# Patient Record
Sex: FEMALE | Race: WHITE | NOT HISPANIC OR LATINO | Employment: PART TIME | URBAN - METROPOLITAN AREA
[De-identification: names, ages, dates, MRNs, and addresses within clinical notes are randomized per-mention and may not be internally consistent; named-entity substitution may affect disease eponyms.]

---

## 2017-01-16 ENCOUNTER — ALLSCRIPTS OFFICE VISIT (OUTPATIENT)
Dept: OTHER | Facility: OTHER | Age: 22
End: 2017-01-16

## 2017-05-12 ENCOUNTER — ALLSCRIPTS OFFICE VISIT (OUTPATIENT)
Dept: OTHER | Facility: OTHER | Age: 22
End: 2017-05-12

## 2017-06-09 ENCOUNTER — ALLSCRIPTS OFFICE VISIT (OUTPATIENT)
Dept: OTHER | Facility: OTHER | Age: 22
End: 2017-06-09

## 2017-09-14 ENCOUNTER — ALLSCRIPTS OFFICE VISIT (OUTPATIENT)
Dept: OTHER | Facility: OTHER | Age: 22
End: 2017-09-14

## 2017-11-14 ENCOUNTER — ALLSCRIPTS OFFICE VISIT (OUTPATIENT)
Dept: OTHER | Facility: OTHER | Age: 22
End: 2017-11-14

## 2017-11-15 NOTE — PROGRESS NOTES
Assessment    1  Acute bacterial sinusitis (461 9) (J01 90,B96 89)   2  Encounter for prescription of oral contraceptives (V25 01) (Z30 011)   3  Depression (311) (F32 9)    Plan  Acute bacterial sinusitis    · Cefuroxime Axetil 500 MG Oral Tablet; TAKE 1 TABLET EVERY 12 HOURS DAILY  Depression, Encounter for prescription of oral contraceptives    · Follow-up visit in 4 Months Evaluation and Treatment  Follow-up  Status: Hold For -Scheduling  Requested for: [id number]  Depression, Screening for cardiovascular condition, Screening for deficiency anemia,Screening for lipoid disorders, Screening for thyroid disorder    · (1) TSH WITH FT4 REFLEX; Status:Active; Requested for:14Nov2017;   Encounter for prescription of oral contraceptives    · Apri 0 15-30 MG-MCG Oral Tablet; Take 1 tablet daily  Screening for cardiovascular condition, Screening for deficiency anemia, Screening forlipoid disorders, Screening for thyroid disorder    · (1) CBC/PLT/DIFF; Status:Active; Requested for:14Nov2017;    · (1) COMPREHENSIVE METABOLIC PANEL; Status:Active; Requested for:14Nov2017;    · (1) LIPID PANEL FASTING W DIRECT LDL REFLEX; Status:Active; Requestedfor:14Nov2017;    · Routine Venipuncture - POC; Status:Active - Perform Order; Requested XRU:[id number];     Discussion/Summary    61-year-old female here today for symptoms and concerns as below:  respiratory symptoms: Persisting for a few weeks, questioning allergy component  I will have her start fluticasone nasal spray and a daily antihistamine OTC such as 24 hour Zyrtec  With persistency of symptoms and concern for sinusitis, I will add a 10 day course of Ceftin antibiotic  Patient to get treatment at least a week and should call if she sees no improvement with symptoms  control: Unfortunately patient had side effects including increased depression and emotional instability on Depo-Provera  She was due for her next dose in July but decided to discontinue the medication  Unfortunately she still continues to have minimal spotting that she does not think is consistent with when she gets her normal periods  She has not had a ânormal â  She is still interested in contraception and would like to consider oral birth control pills  Risks versus benefits and side effects discussed  I believe this would serve benefit of getting her periods and cycle back on track in addition to pregnancy prevention  Patient has not been sexually active  I will have her start the 1st pill in the pack the Sunday after she gets her spotting cycle  I will start her on a pre monophasic pack  I will follow up with her in 4 months  Condom use was highly encouraged for secondary pregnancy prevention as well as STD prevention  I did educate patient on the necessity for compliance taking it on a daily basis as well as taking it around the same time of day for it to be most effective  depression/sleep issues: Unfortunately patient continues to struggle emotionally with some depression symptoms  I have highly encouraged that she follow up with a therapist for some psychotherapy as I do believe a large majority of it is related to losing her best friend who passed away a few months ago and has not been able to deal with it since that time  She states that she has not found time to see a therapist as she has been very busy with school which she also has found has helped emotionally by keeping her very busy  She also states a friend from [place] will be coming up to visit which she thinks will also be beneficial  Unfortunately after further questioning with her physical she does admit to intermittent marijuana use mainly at night to help her sleep  Of course I do not recommend this and do not find this as appropriate means to treat the sleep disturbances   However, after discussing various methods of treatment such as trying an antidepressant or atypical antidepressants such as trazodone or nortriptyline to treat the insomnia patient continues to refuse  I educated patient on the risks of utilizing marijuana and that I would not support her use but I certainly cannot force patient to stop  I asked that she continue to think about my recommendations  Possible side effects of new medications were reviewed with the patient/guardian today  The treatment plan was reviewed with the patient/guardian  The patient/guardian understands and agrees with the treatment plan      Chief Complaint  pt concerned about URI sxs and sore throat      History of Present Illness  HPI: 21y/o female here today for URI sxs persisting for past few weeks, now sore throat and painful to swallow  no fevers  Slight sinus pressure/congestion, PND  neck glands swollen  slight dry cough  tried nothing for it  also would like to consider oral BC  has been off depo for a few months  has intermittent spotting but no period since d/c  pt sexually active, not recently  admits trouble sleeping, using marijuana a few times a week to help her sleep  still struggling emotionally, depression, but holding off on therapy b/c busy at school which has been helping  she refuses oral medications to tx insomnia and depression  Review of Systems   Constitutional: as noted in HPI   ENT: as noted in HPI  Cardiovascular: no complaints of slow or fast heart rate, no chest pain, no palpitations, no leg claudication or lower extremity edema  Respiratory: as noted in HPI  Genitourinary: as noted in HPI  Neurological: no complaints of headache, no confusion, no numbness or tingling, no dizziness or fainting  Other Symptoms: psych sxs as above  Active Problems  1  Depression (311) (F32 9)   2  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)    Past Medical History  1  History of Denial Of Any Significant Medical History   2  History of Depression screening (V79 0) (Z13 89)   3  History of Localized superficial swelling of skin (782 2) (R22 9)   4   History of Need for HPV vaccination (V04 89) (Z23)   5  History of Need for influenza vaccination (V04 81) (Z23)   6  History of Need for Menactra vaccination (V03 89) (Z23)   7  History of Pain of left upper arm (729 5) (M79 622)   8  History of Strain of quadriceps muscle, left, initial encounter (843 8) (S44 310V)    Family History  Mother    1  Family history of Hypothyroidism  Maternal Grandmother    2  Family history of Kidney Cancer (V16 51)  Paternal Grandmother    3  Family history of Breast Cancer (V16 3)  Maternal Grandfather    4  Family history of Lymphoma (V16 7)  Paternal Grandfather    11  Family history of Lung Cancer (V16 1)    Social History   · Smoking Cigarettes (V15 82)  The social history was reviewed and updated today  Surgical History    1  History of Oral Surgery Tooth Extraction    Current Meds   1  ValACYclovir HCl - 1 GM Oral Tablet; TAKE 2 TABLETS EVERY 12 HOURS; Therapy: [id number] to (Evaluate:67Qzt3909)  Requested for: 93Ykv8666; Last Rx:[id number] Ordered    The medication list was reviewed and updated today  Allergies  1  No Known Drug Allergies  2  No Known Food Allergies   3  Seasonal    Vitals   Recorded: 02MYP1262 02:34PM   Temperature 98 6 F   Heart Rate 86   Respiration 19   Systolic 560   Diastolic 64   Height 5 ft 8 in   Weight 145 lb 8 oz   BMI Calculated 22 12   BSA Calculated 1 79   O2 Saturation 97   LMP depo   Pain Scale 0       Physical Exam   Constitutional  General appearance: No acute distress, well appearing and well nourished  appears healthy,-- comfortable,-- within normal limits of ideal weight-- and-- appearance reflects stated age  Eyes  Conjunctiva and lids: No swelling, erythema or discharge  Ears, Nose, Mouth, and Throat  External inspection of ears and nose: Normal    Otoscopic examination: Tympanic membranes translucent with normal light reflex  Canals patent without erythema     Nasal mucosa, septum, and turbinates: Abnormal   There was a mucoid discharge from both nares  The bilateral nasal mucosa was boggy,-- edematous-- and-- red  mild swelling and erythema  Oropharynx: Abnormal   The posterior pharynx mild posterior pharynx injection, PND, but-- did not have an exudate  Pulmonary  Respiratory effort: No increased work of breathing or signs of respiratory distress  Auscultation of lungs: Clear to auscultation  Cardiovascular  Auscultation of heart: Normal rate and rhythm, normal S1 and S2, without murmurs  Lymphatic  Palpation of lymph nodes in neck: Abnormal  -- mild tender submandibular glands B/L  Psychiatric  Orientation to person, place, and time: Normal    Mood and affect: Normal   Mood and Affect: appropriate mood-- and-- appropriate affect  Additional Exam:  vitals reviewed          Signatures   Electronically signed by : Gregory Mora, [facility]; Nov 14 2017  3:39PM EST                       (Author)    Electronically signed by : Naty Palma DO; Nov 14 2017  4:29PM EST

## 2017-12-04 DIAGNOSIS — Z83.49 FAMILY HISTORY OF OTHER ENDOCRINE, NUTRITIONAL AND METABOLIC DISEASES (CODE): ICD-10-CM

## 2018-01-11 NOTE — PROGRESS NOTES
Assessment    1  Encounter for preventive health examination (V70 0) (Z00 00)    Plan  Acute bacterial sinusitis    · Cefuroxime Axetil 500 MG Oral Tablet; TAKE 1 TABLET EVERY 12 HOURS DAILY  Depression, Encounter for prescription of oral contraceptives    · Follow-up visit in 4 Months Evaluation and Treatment  Follow-up  Status: Hold For -  Scheduling  Requested for: [id number]  Depression, Screening for cardiovascular condition, Screening for deficiency anemia,  Screening for lipoid disorders, Screening for thyroid disorder    · (1) TSH WITH FT4 REFLEX; Status:Active; Requested for:14Nov2017;   Encounter for prescription of oral contraceptives    · Apri 0 15-30 MG-MCG Oral Tablet; Take 1 tablet daily  Need for influenza vaccination    · Stop: Fluzone Quadrivalent Intramuscular Suspension  Screening for cardiovascular condition, Screening for deficiency anemia, Screening for  lipoid disorders, Screening for thyroid disorder    · (1) CBC/PLT/DIFF; Status:Active; Requested for:14Nov2017;    · (1) COMPREHENSIVE METABOLIC PANEL; Status:Active; Requested for:14Nov2017;    · (1) LIPID PANEL FASTING W DIRECT LDL REFLEX; Status:Active; Requested  for:14Nov2017;     Discussion/Summary  health maintenance visit Currently, she eats a healthy diet and has an inadequate exercise regimen  the risks and benefits of cervical cancer screening were discussed recommended age 26 Testing was done today for no risk  Breast cancer screening: the risks and benefits of breast cancer screening were discussed and breast cancer screening is not indicated  Colorectal cancer screening: colorectal cancer screening is not indicated  Screening lab work includes Patient would like blood work, she is fasting  Will order lipids, CBC, CMP and thyroid for screening  Unfortunately venipuncture was unsuccessful so she will return in a few days to have a redrawn   Patient is concerned about anemia as she does note bumping into her leg and still has faint bruising on her right knee and right lateral upper thigh  We will call her with CBC results and determine if any further testing is needed  The Patient refuses influenza vaccine  She was advised to be evaluated by an ophthalmologist and a dentist  Advice and education were given regarding nutrition, aerobic exercise, weight bearing exercise, reproductive health, contraception and cardiovascular risk reduction  Patient discussion: discussed with the patient, Please refer to acute note for problem discussion  Possible side effects of new medications were reviewed with the patient/guardian today  The treatment plan was reviewed with the patient/guardian  The patient/guardian understands and agrees with the treatment plan      Chief Complaint  Pt presents for annual physical       History of Present Illness  HM, Adult Female: The patient is being seen for a health maintenance evaluation  General Health: The patient's health since the last visit is described as good   pt doing well since last appt, feeling ill, see acute note  no hospitalizations  She does not have regular dental visits  (going to dentist tomorrow)   She denies vision problems  Vision care includes wearing glasses, wearing soft contact lenses and last eye exam: summer 2017  She denies hearing loss  Immunizations status: not up to date   refuses influenza vaccine  Lifestyle:  She consumes a diverse and healthy diet  She is on a low carbohydrate diet  Dietary details include 1 servings of fruit per day, 0-1 servings of vegetables per day, 16-24 ounces of water per day, 1 cups of coffee per day and 1 cans of regular soda per day  She does not have any weight concerns  She does not exercise regularly  She uses tobacco  (smokes marijuana - uses most nights to help sleep)   She denies alcohol use  She denies drug use  Reproductive health:  she reports abnormal menses (spotting since stopping depo july 2017  hasn't had a regular period since that time  spotting every few weeks)  Menstrual history: LMP: the patient is unsure of the date of her LMP  she uses no contraception  she is not sexually active  Screening: Cervical cancer screening includes no previous pap smear  Breast cancer screening includes no previous mammogram  Colorectal cancer screening includes no previous colonoscopy  Review of Systems    Constitutional: No fever, no chills, feels well, no tiredness, no recent weight gain or weight loss  Eyes: No complaints of eye pain, no red eyes, no eyesight problems, no discharge, no dry eyes, no itching of eyes  ENT: see acute note  Cardiovascular: No complaints of slow heart rate, no fast heart rate, no chest pain, no palpitations, no leg claudication, no lower extremity edema  Respiratory: No complaints of shortness of breath, no wheezing, no cough, no SOB on exertion, no orthopnea, no PND  Gastrointestinal: No complaints of abdominal pain, no constipation, no nausea or vomiting, no diarrhea, no bloody stools  Genitourinary: see acute note, but as noted in HPI  Musculoskeletal: occasional neck stiffness/spasm  Integumentary: No complaints of skin rash or lesions, no itching, no skin wounds, no breast pain or lump  Neurological: No complaints of headache, no confusion, no convulsions, no numbness, no dizziness or fainting, no tingling, no limb weakness, no difficulty walking  Psychiatric: see acute note, but as noted in HPI  Endocrine: No complaints of proptosis, no hot flashes, no muscle weakness, no deepening of the voice, no feelings of weakness  Hematologic/Lymphatic: a tendency for easy bruising  Active Problems    1  Depression (311) (F32 9)   2   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)    Past Medical History    · History of Denial Of Any Significant Medical History   · History of Depression screening (V79 0) (Z13 89)   · History of Need for HPV vaccination (V04 89) (Z23)   · History of Need for Menactra vaccination (V03 89) (Z23)    Surgical History    · History of Oral Surgery Tooth Extraction    Family History  Mother    · Family history of Hypothyroidism  Maternal Grandmother    · Family history of Kidney Cancer (V16 51)  Paternal Grandmother    · Family history of Breast Cancer (V16 3)  Maternal Grandfather    · Family history of Lymphoma (V16 7)  Paternal Grandfather    · Family history of Lung Cancer (V16 1)    Social History    · Smoking Cigarettes (V15 82)    Current Meds   1  ValACYclovir HCl - 1 GM Oral Tablet; TAKE 2 TABLETS EVERY 12 HOURS; Therapy: [id number] to (Evaluate:[id number])  Requested for: 86Rwo6891; Last   Rx:[id number] Ordered    Allergies    1  No Known Drug Allergies    2  No Known Food Allergies   3  Seasonal    Vitals   Recorded: 16EAZ7901 02:34PM   Temperature 98 6 F   Heart Rate 86   Respiration 19   Systolic 498   Diastolic 64   Height 5 ft 8 in   Weight 145 lb 8 oz   BMI Calculated 22 12   BSA Calculated 1 79   O2 Saturation 97   LMP depo   Pain Scale 0     Physical Exam    Constitutional   General appearance: No acute distress, well appearing and well nourished  appears healthy, within normal limits of ideal weight and appearance reflects stated age  vitals reviewed  Head and Face   Head and face: Normal     Eyes   Conjunctiva and lids: No swelling, erythema or discharge  Pupils and irises: Equal, round, reactive to light  EOMI, PERRLA B/L  Ears, Nose, Mouth, and Throat   External inspection of ears and nose: Normal     Otoscopic examination: Tympanic membranes translucent with normal light reflex  Canals patent without erythema  Hearing: Normal   grossly normal B/L at 10ft  Nasal mucosa, septum, and turbinates: Abnormal   see acute note  Lips, teeth, and gums: Normal, good dentition  Oropharynx: Abnormal   see acute note  Neck   Neck: Supple, symmetric, trachea midline, no masses  Thyroid: Normal, no thyromegaly      Pulmonary   Respiratory effort: No increased work of breathing or signs of respiratory distress  Auscultation of lungs: Clear to auscultation  Cardiovascular   Auscultation of heart: Normal rate and rhythm, normal S1 and S2, no murmurs  Carotid pulses: 2+ bilaterally  right 2+, no bruit heard over the right carotid, left 2+ and no bruit heard over the left carotid  Pedal pulses: 2+ bilaterally  Examination of extremities for edema and/or varicosities: Normal     Abdomen   Abdomen: Non-tender, no masses  The abdomen was flat  Bowel sounds were normal  The abdomen was soft and nontender  Lymphatic   Palpation of lymph nodes in neck: No lymphadenopathy  Musculoskeletal   Gait and station: Normal     Digits and nails: Normal without clubbing or cyanosis  Joints, bones, and muscles: Normal     Range of motion: Normal     Stability: Normal     Muscle strength/tone: Normal     Skin   Skin and subcutaneous tissue: Abnormal   slight bruising noted right knee and right lateral mid thigh  Neurologic   Cranial nerves: Cranial nerves II-XII intact  Reflexes: 2+ and symmetric  Deep tendon reflexes: 2+ right brachioradialis, 2+ left brachioradialis, 2+ right patella and 2+ left patella  Coordination: Normal finger to nose and heel to shin      Psychiatric   Judgment and insight: Normal     Orientation to person, place, and time: Normal     Mood and affect: Normal        Signatures   Electronically signed by : Shea Sandoval, [facility]; Nov 14 2017  3:46PM EST                       (Author)    Electronically signed by : Varghese Dc DO; Nov 14 2017  4:29PM EST

## 2018-01-12 VITALS
WEIGHT: 156.38 LBS | HEIGHT: 66 IN | OXYGEN SATURATION: 99 % | SYSTOLIC BLOOD PRESSURE: 118 MMHG | DIASTOLIC BLOOD PRESSURE: 78 MMHG | BODY MASS INDEX: 25.13 KG/M2 | TEMPERATURE: 99.5 F | HEART RATE: 64 BPM | RESPIRATION RATE: 16 BRPM

## 2018-01-12 VITALS
HEIGHT: 66 IN | SYSTOLIC BLOOD PRESSURE: 102 MMHG | RESPIRATION RATE: 16 BRPM | OXYGEN SATURATION: 98 % | HEART RATE: 98 BPM | WEIGHT: 147 LBS | TEMPERATURE: 97.9 F | DIASTOLIC BLOOD PRESSURE: 58 MMHG | BODY MASS INDEX: 23.63 KG/M2

## 2018-01-13 VITALS
RESPIRATION RATE: 19 BRPM | HEART RATE: 86 BPM | WEIGHT: 145.5 LBS | TEMPERATURE: 98.6 F | OXYGEN SATURATION: 97 % | BODY MASS INDEX: 22.05 KG/M2 | DIASTOLIC BLOOD PRESSURE: 64 MMHG | SYSTOLIC BLOOD PRESSURE: 102 MMHG | HEIGHT: 68 IN

## 2018-01-13 VITALS
BODY MASS INDEX: 23.84 KG/M2 | SYSTOLIC BLOOD PRESSURE: 100 MMHG | RESPIRATION RATE: 18 BRPM | WEIGHT: 148.38 LBS | DIASTOLIC BLOOD PRESSURE: 62 MMHG | HEIGHT: 66 IN | HEART RATE: 72 BPM | TEMPERATURE: 98.2 F | OXYGEN SATURATION: 98 %

## 2018-01-14 VITALS
WEIGHT: 145.25 LBS | RESPIRATION RATE: 20 BRPM | DIASTOLIC BLOOD PRESSURE: 70 MMHG | HEART RATE: 97 BPM | TEMPERATURE: 98.7 F | BODY MASS INDEX: 22.01 KG/M2 | OXYGEN SATURATION: 99 % | SYSTOLIC BLOOD PRESSURE: 110 MMHG | HEIGHT: 68 IN

## 2018-02-22 RX ORDER — VALACYCLOVIR HYDROCHLORIDE 1 G/1
2 TABLET, FILM COATED ORAL EVERY 12 HOURS
COMMUNITY
Start: 2017-09-14 | End: 2019-03-05 | Stop reason: SDUPTHER

## 2018-02-22 RX ORDER — DESOGESTREL AND ETHINYL ESTRADIOL 0.15-0.03
1 KIT ORAL DAILY
COMMUNITY
Start: 2017-11-14 | End: 2018-07-29 | Stop reason: SDUPTHER

## 2018-03-26 ENCOUNTER — OFFICE VISIT (OUTPATIENT)
Dept: FAMILY MEDICINE CLINIC | Facility: CLINIC | Age: 23
End: 2018-03-26
Payer: COMMERCIAL

## 2018-03-26 VITALS
HEIGHT: 68 IN | DIASTOLIC BLOOD PRESSURE: 70 MMHG | SYSTOLIC BLOOD PRESSURE: 104 MMHG | OXYGEN SATURATION: 99 % | RESPIRATION RATE: 16 BRPM | BODY MASS INDEX: 21.1 KG/M2 | WEIGHT: 139.2 LBS | TEMPERATURE: 99.1 F | HEART RATE: 79 BPM

## 2018-03-26 DIAGNOSIS — M54.50 ACUTE BILATERAL LOW BACK PAIN WITHOUT SCIATICA: ICD-10-CM

## 2018-03-26 DIAGNOSIS — R63.4 ABNORMAL WEIGHT LOSS: ICD-10-CM

## 2018-03-26 DIAGNOSIS — Z30.41 USES ORAL CONTRACEPTION: ICD-10-CM

## 2018-03-26 DIAGNOSIS — F32.A DEPRESSION, UNSPECIFIED DEPRESSION TYPE: Primary | ICD-10-CM

## 2018-03-26 DIAGNOSIS — M25.531 RIGHT WRIST PAIN: ICD-10-CM

## 2018-03-26 DIAGNOSIS — S02.5XXA CLOSED FRACTURE OF TOOTH, INITIAL ENCOUNTER: ICD-10-CM

## 2018-03-26 PROCEDURE — 99214 OFFICE O/P EST MOD 30 MIN: CPT | Performed by: PHYSICIAN ASSISTANT

## 2018-03-26 RX ORDER — TRAMADOL HYDROCHLORIDE 50 MG/1
50 TABLET ORAL EVERY 8 HOURS PRN
Qty: 10 TABLET | Refills: 0 | Status: SHIPPED | OUTPATIENT
Start: 2018-03-26 | End: 2019-11-01

## 2018-03-26 NOTE — PROGRESS NOTES
Assessment/Plan:    Depression  Stable without medication  Looking into seeing therapist with mom which I encouraged  Better since off of depo  Will continue to monitor  No SI  Uses oral contraception  Just started about 4-5 weeks ago, some spotting, normal period during placebo, seeming to tolerate better  Will continue to monitor  Advised PAP at her 23st birthday in august        Diagnoses and all orders for this visit:    Depression, unspecified depression type    Closed fracture of tooth, initial encounter  -     traMADol (ULTRAM) 50 mg tablet; Take 1 tablet (50 mg total) by mouth every 8 (eight) hours as needed for moderate pain or severe pain (tooth pain)    Acute bilateral low back pain without sciatica    Right wrist pain    Uses oral contraception    Abnormal weight loss       Patient is a 61-year-old female presenting today for close follow-up to depression in addition to starting oral contraceptives  Overall her depression has improved since discontinuation of depo provera  There seems to be some life stressors and triggers  I encouraged her to continue pursuing a therapist but will  Just continue to follow and monitor her for now without any medication  She is doing well on oral contraceptives and is tolerating them well  She seems to have some breakthrough spotting which can be normal but did have a normal   On placebo  She will continue with the birth control pills and I suggested scheduling a Pap after 23st birthday this August   Patient has been complaining of some acute right wrist pain with gripping and lifting in addition to some acute low back discomfort without radiation of pain for the past few weeks  She does work at a [employer] and states that she does a lot of hand and wrist work as well as leading over a lot but there seems to be no acute injury or clear cause  I do believe her back could be muscular in addition to her right wrist possibly tendinitis from overuse    I have advised that she continue anti-inflammatories such as ibuprofen, Motrin or Advil as needed taken with food  I advised that she start ice to her back and wrist several times a day in addition to some gentle stretches to her back that were demonstrated in the office  I did suggest that she consider wrist brace wearing it at work and 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 to immobilize  We will see how she does with this conservative management and she should let us know should symptoms worsen  She did also expressed concern about a fractured 2, left lower molar  She does have a dental visit next week but is in extreme  Pain intermittently  There is an obvious large fracture with piece of the tooth missing in center of the tooth exposed  I will place her on tramadol sparingly but only for short while until she can see the dentist and get the tooth fixed  Patient understands and she is aware of side effects of tramadol including drowsiness and should not take this while driving or working  Lastly, I remain concerned about patient's slow weight loss  She has lost about 6 lb since her last appointment in November 2017  This is relatively unintentional but patient states that sometimes she is too busy to remember to eat and her appetite is not as good  She is still overall healthy weight but certainly if her weight loss continues this will be an extreme concern  For now we will continue to monitor  We will see her back in about 4-5 months for her Pap after she turns 23 and recheck  Chief Complaint   Patient presents with    Follow-up     4M    Wrist Pain     x2/3wks on and off pt states she takes motrin w/ little relief   Back Pain     x2/3wks on and off       Subjective:      Patient ID: Whitney Rowley is a 23 y o  female     23y/o female here today for f/u to depression, some concerns  She is having more good days than bad days regarding her depression and emotion   Stressors include being alone, started dating a boy and he moved to [place] for a few months for work  Just started right wrist pain x 2 weeks, no injury but does a lot of squeezing and grasping with right hand, at work, works at a [employer]  She is right handed  She has also been having some LBP leaning over a lot as well  Was having some stomach pain for a few days, was drinking a lot of monster energy drinks, has cut back and is better, cramping feeling in epigastric region, some nausea x 1 week with eating  She had some episodes of spotting since being on [facility] but did have normal period on placebo (approx 3/4)  Has only been on 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     Her family history includes Alpha-1 antitrypsin deficiency in her family; Breast cancer in her paternal grandmother; Hypothyroidism in her mother; Kidney cancer in her maternal grandmother; Lung cancer in her paternal grandfather; Lymphoma in her maternal grandfather  She  reports that she has been smoking Cigarettes  She has never used smokeless tobacco  She reports that she uses drugs, including Marijuana  She reports that she does not drink alcohol    Current Outpatient Prescriptions   Medication Sig Dispense Refill    desogestrel-ethinyl estradiol (APRI) 0 15-30 MG-MCG per tablet Take 1 tablet by mouth daily      valACYclovir (VALTREX) 1,000 mg tablet Take 2 tablets by mouth every 12 (twelve) hours      traMADol (ULTRAM) 50 mg tablet Take 1 tablet (50 mg total) by mouth every 8 (eight) hours as needed for moderate pain or severe pain (tooth pain) 10 tablet 0     No current facility-administered medications for this visit  She is allergic to pollen extract       Review of Systems   Constitutional: Positive for unexpected weight change  HENT:        Tooth issue as in HPI   Respiratory: Negative  Cardiovascular: Negative  Gastrointestinal:        As in HPI   Genitourinary: Negative  Musculoskeletal:        As in HPI   Neurological: Negative  Psychiatric/Behavioral: Negative  Objective:      /70 (BP Location: Left arm, Patient Position: Sitting, Cuff Size: Standard)   Pulse 79   Temp 99 1 °F (37 3 °C) (Tympanic)   Resp 16   Ht 5' 8" (1 727 m)   Wt 63 1 kg (139 lb 3 2 oz)   LMP 01/15/2018   SpO2 99%   BMI 21 17 kg/m²          Physical Exam   Constitutional: She is oriented to person, place, and time  She appears well-developed and well-nourished  Normal BMI but 6lb weight loss since 11/2017  HENT:   Mouth/Throat: Abnormal dentition (left lower molar with obvious destin fracture, piece of tooth missing and center exposed  gum intact  )  Neck: Neck supple  Cardiovascular: Normal rate, regular rhythm, normal heart sounds and normal pulses  Pulmonary/Chest: Effort normal and breath sounds normal    Abdominal: Normal appearance and bowel sounds are normal  There is no tenderness  Musculoskeletal:   Right wrist:   Appearing normal without redness, swelling, ecchymosis or rash  There is no tenderness to palpation of the right wrist, hand or forearm  She has full range of motion of her right wrist without any instability or pain,  strength is intact but does reproduce some mild distal ulnar forearm discomfort on the right side  Low back: Appearing normal, no tenderness to palpation  Full range of motion in all directions  Neurological: She is alert and oriented to person, place, and time  Psychiatric: She has a normal mood and affect  Her behavior is normal  Thought content normal    Vitals reviewed

## 2018-03-28 PROBLEM — B00.1 RECURRENT COLD SORES: Status: ACTIVE | Noted: 2017-09-14

## 2018-03-28 PROBLEM — Z30.41 USES ORAL CONTRACEPTION: Status: ACTIVE | Noted: 2018-03-28

## 2018-03-28 PROBLEM — F32.A DEPRESSION: Status: ACTIVE | Noted: 2017-09-14

## 2018-03-28 NOTE — ASSESSMENT & PLAN NOTE
Stable without medication  Looking into seeing therapist with mom which I encouraged  Better since off of depo  Will continue to monitor   No SI

## 2018-03-28 NOTE — ASSESSMENT & PLAN NOTE
Just started about 4-5 weeks ago, some spotting, normal period during placebo, seeming to tolerate better  Will continue to monitor   Advised PAP at her 23st birthday in august

## 2018-07-29 DIAGNOSIS — Z30.9 ENCOUNTER FOR CONTRACEPTIVE MANAGEMENT, UNSPECIFIED TYPE: Primary | ICD-10-CM

## 2018-07-31 RX ORDER — DESOGESTREL AND ETHINYL ESTRADIOL 0.15-0.03
KIT ORAL
Qty: 28 TABLET | Refills: 5 | Status: SHIPPED | OUTPATIENT
Start: 2018-07-31 | End: 2019-02-11 | Stop reason: SDUPTHER

## 2018-08-06 ENCOUNTER — ANNUAL EXAM (OUTPATIENT)
Dept: FAMILY MEDICINE CLINIC | Facility: CLINIC | Age: 23
End: 2018-08-06
Payer: COMMERCIAL

## 2018-08-06 VITALS
HEART RATE: 78 BPM | SYSTOLIC BLOOD PRESSURE: 114 MMHG | TEMPERATURE: 97.2 F | WEIGHT: 135.3 LBS | DIASTOLIC BLOOD PRESSURE: 70 MMHG | HEIGHT: 66 IN | OXYGEN SATURATION: 96 % | BODY MASS INDEX: 21.74 KG/M2

## 2018-08-06 DIAGNOSIS — Z01.419 ENCOUNTER FOR GYNECOLOGICAL EXAMINATION WITH PAPANICOLAOU SMEAR OF CERVIX: Primary | ICD-10-CM

## 2018-08-06 DIAGNOSIS — Z12.4 SCREENING FOR CERVICAL CANCER: ICD-10-CM

## 2018-08-06 DIAGNOSIS — R63.4 ABNORMAL WEIGHT LOSS: ICD-10-CM

## 2018-08-06 DIAGNOSIS — Z11.3 SCREEN FOR SEXUALLY TRANSMITTED DISEASES: ICD-10-CM

## 2018-08-06 DIAGNOSIS — Z30.41 USES ORAL CONTRACEPTION: ICD-10-CM

## 2018-08-06 LAB
ALBUMIN SERPL BCP-MCNC: 4 G/DL (ref 3.5–5)
ALP SERPL-CCNC: 20 U/L (ref 46–116)
ALT SERPL W P-5'-P-CCNC: 23 U/L (ref 12–78)
ANION GAP SERPL CALCULATED.3IONS-SCNC: 6 MMOL/L (ref 4–13)
AST SERPL W P-5'-P-CCNC: 13 U/L (ref 5–45)
BASOPHILS # BLD AUTO: 0.04 THOUSANDS/ΜL (ref 0–0.1)
BASOPHILS NFR BLD AUTO: 1 % (ref 0–1)
BILIRUB SERPL-MCNC: 0.39 MG/DL (ref 0.2–1)
BUN SERPL-MCNC: 10 MG/DL (ref 5–25)
CALCIUM SERPL-MCNC: 9.4 MG/DL (ref 8.3–10.1)
CHLORIDE SERPL-SCNC: 106 MMOL/L (ref 100–108)
CO2 SERPL-SCNC: 27 MMOL/L (ref 21–32)
CREAT SERPL-MCNC: 0.85 MG/DL (ref 0.6–1.3)
EOSINOPHIL # BLD AUTO: 0.57 THOUSAND/ΜL (ref 0–0.61)
EOSINOPHIL NFR BLD AUTO: 7 % (ref 0–6)
ERYTHROCYTE [DISTWIDTH] IN BLOOD BY AUTOMATED COUNT: 11.9 % (ref 11.6–15.1)
EST. AVERAGE GLUCOSE BLD GHB EST-MCNC: 97 MG/DL
GFR SERPL CREATININE-BSD FRML MDRD: 98 ML/MIN/1.73SQ M
GLUCOSE SERPL-MCNC: 71 MG/DL (ref 65–140)
HBA1C MFR BLD: 5 % (ref 4.2–6.3)
HCT VFR BLD AUTO: 42.9 % (ref 34.8–46.1)
HGB BLD-MCNC: 14.1 G/DL (ref 11.5–15.4)
IMM GRANULOCYTES # BLD AUTO: 0.02 THOUSAND/UL (ref 0–0.2)
IMM GRANULOCYTES NFR BLD AUTO: 0 % (ref 0–2)
LYMPHOCYTES # BLD AUTO: 2.45 THOUSANDS/ΜL (ref 0.6–4.47)
LYMPHOCYTES NFR BLD AUTO: 30 % (ref 14–44)
MCH RBC QN AUTO: 31.1 PG (ref 26.8–34.3)
MCHC RBC AUTO-ENTMCNC: 32.9 G/DL (ref 31.4–37.4)
MCV RBC AUTO: 95 FL (ref 82–98)
MONOCYTES # BLD AUTO: 0.67 THOUSAND/ΜL (ref 0.17–1.22)
MONOCYTES NFR BLD AUTO: 8 % (ref 4–12)
NEUTROPHILS # BLD AUTO: 4.41 THOUSANDS/ΜL (ref 1.85–7.62)
NEUTS SEG NFR BLD AUTO: 54 % (ref 43–75)
NRBC BLD AUTO-RTO: 0 /100 WBCS
PLATELET # BLD AUTO: 207 THOUSANDS/UL (ref 149–390)
PMV BLD AUTO: 12.1 FL (ref 8.9–12.7)
POTASSIUM SERPL-SCNC: 3.8 MMOL/L (ref 3.5–5.3)
PROT SERPL-MCNC: 7.7 G/DL (ref 6.4–8.2)
RBC # BLD AUTO: 4.53 MILLION/UL (ref 3.81–5.12)
SODIUM SERPL-SCNC: 139 MMOL/L (ref 136–145)
TSH SERPL DL<=0.05 MIU/L-ACNC: 0.82 UIU/ML (ref 0.36–3.74)
WBC # BLD AUTO: 8.16 THOUSAND/UL (ref 4.31–10.16)

## 2018-08-06 PROCEDURE — 80053 COMPREHEN METABOLIC PANEL: CPT | Performed by: PHYSICIAN ASSISTANT

## 2018-08-06 PROCEDURE — 87591 N.GONORRHOEAE DNA AMP PROB: CPT | Performed by: PHYSICIAN ASSISTANT

## 2018-08-06 PROCEDURE — G0145 SCR C/V CYTO,THINLAYER,RESCR: HCPCS | Performed by: PHYSICIAN ASSISTANT

## 2018-08-06 PROCEDURE — S0612 ANNUAL GYNECOLOGICAL EXAMINA: HCPCS | Performed by: PHYSICIAN ASSISTANT

## 2018-08-06 PROCEDURE — 84443 ASSAY THYROID STIM HORMONE: CPT | Performed by: PHYSICIAN ASSISTANT

## 2018-08-06 PROCEDURE — 87491 CHLMYD TRACH DNA AMP PROBE: CPT | Performed by: PHYSICIAN ASSISTANT

## 2018-08-06 PROCEDURE — 85025 COMPLETE CBC W/AUTO DIFF WBC: CPT | Performed by: PHYSICIAN ASSISTANT

## 2018-08-06 PROCEDURE — 83036 HEMOGLOBIN GLYCOSYLATED A1C: CPT | Performed by: PHYSICIAN ASSISTANT

## 2018-08-06 PROCEDURE — 36415 COLL VENOUS BLD VENIPUNCTURE: CPT | Performed by: PHYSICIAN ASSISTANT

## 2018-08-06 NOTE — PROGRESS NOTES
Assessment/Plan:      Diagnoses and all orders for this visit:    Encounter for gynecological examination with Papanicolaou smear of cervix    Uses oral contraception    Abnormal weight loss  -     CBC and differential  -     Comprehensive metabolic panel  -     TSH, 3rd generation with T4 reflex  -     Hemoglobin A1C    Screening for cervical cancer  -     Liquid-based pap, screening    Screen for sexually transmitted diseases  -     Chlamydia/GC amplified DNA by PCR; Future        [de-identified] year old female presenting today for gyn exam/ 1st Pap smear  Age-appropriate education/preventatives discussed  STD screening today  Pap collected and we will call her with those results as well  She is doing well on her oral contraceptive and is compliant  Exam unremarkable today  She has lost about 30 lb in the past year and a half or so but patient states intentional as she has been eating well, exercising  And working a lot and has been living a more healthy lifestyle than what she was  I would prefer to collect some blood work today to rule out underlying cause including CBC, CMP, A1c in thyroid  We will call her with blood work results when available  Otherwise will do 3 consecutive yearly PAPs based on results, then consider q2-3 years if all normal     Chief Complaint   Patient presents with    Gynecologic Exam       Subjective:     Patient ID: Anna Self is a 26 y o  female  24y/o female here today for annual PAP/GYN, first  She overall is doing well, no concerns  She has lost weight from last previous appts, about 30lbs, But attributes that to eating better, very active and also exercising a lot  Emotionally she is doing well  Denies eating d/o sxs  She takes MVI  Wearing sunscreen, seatbelt  Feels safe at home  Dental cleanings q6 months  Wears contacts/glasses, seeing eye dr Madelin Powell  Pt has been sexually active in past, feb 2018 last time  Uses condoms, compliant on oral BC   Periods are normal, minimal cramping  She gets period with placebo (on oral BC)  LMP: approx July 30, 2018  Review of Systems   Constitutional: Negative  Respiratory: Negative  Cardiovascular: Negative  Gastrointestinal: Negative  Endocrine: Negative  Genitourinary: Negative  Neurological: Negative  Psychiatric/Behavioral: Negative  The following portions of the patient's history were reviewed and updated as appropriate: allergies, current medications, past family history, past medical history, past social history, past surgical history and problem list       Objective:     Physical Exam   Constitutional: Vital signs are normal  She appears well-developed and well-nourished  She does not appear ill  Cardiovascular: Normal rate, regular rhythm, normal heart sounds and normal pulses  No murmur heard  Pulmonary/Chest: Effort normal and breath sounds normal    Abdominal: Normal appearance and bowel sounds are normal  There is no tenderness  Genitourinary: Vagina normal and uterus normal  No breast swelling, tenderness, discharge or bleeding  There is no rash, tenderness or lesion on the right labia  There is no rash, tenderness or lesion on the left labia  Cervix exhibits no motion tenderness and no discharge  Right adnexum displays no mass and no tenderness  Left adnexum displays no mass and no tenderness  No tenderness in the vagina  No vaginal discharge found  Genitourinary Comments: Breasts appearing normal without rash, bruising, dimpling or distortion  No palpable masses In either breast   No nipple discharge  No axillary LAD  PAP performed     Lymphadenopathy:     She has no cervical adenopathy  Right: No inguinal adenopathy present  Left: No inguinal adenopathy present  Psychiatric: She has a normal mood and affect  Her speech is normal    Vitals reviewed        Vitals:    08/06/18 1425   BP: 114/70   BP Location: Left arm   Patient Position: Sitting   Pulse: 78 Temp: (!) 97 2 °F (36 2 °C)   TempSrc: Tympanic   SpO2: 96%   Weight: 61 4 kg (135 lb 4 8 oz)   Height: 5' 6" (1 676 m)

## 2018-08-07 LAB
CHLAMYDIA DNA CVX QL NAA+PROBE: NORMAL
N GONORRHOEA DNA GENITAL QL NAA+PROBE: NORMAL

## 2018-08-09 LAB
LAB AP GYN PRIMARY INTERPRETATION: NORMAL
LAB AP LMP: NORMAL
Lab: NORMAL
PATH INTERP SPEC-IMP: NORMAL

## 2018-08-13 ENCOUNTER — TELEPHONE (OUTPATIENT)
Dept: FAMILY MEDICINE CLINIC | Facility: CLINIC | Age: 23
End: 2018-08-13

## 2019-02-11 DIAGNOSIS — Z30.9 ENCOUNTER FOR CONTRACEPTIVE MANAGEMENT, UNSPECIFIED TYPE: ICD-10-CM

## 2019-02-11 RX ORDER — DESOGESTREL AND ETHINYL ESTRADIOL 0.15-0.03
KIT ORAL
Qty: 28 TABLET | Refills: 5 | Status: SHIPPED | OUTPATIENT
Start: 2019-02-11 | End: 2019-07-27 | Stop reason: SDUPTHER

## 2019-03-05 ENCOUNTER — TELEPHONE (OUTPATIENT)
Dept: FAMILY MEDICINE CLINIC | Facility: CLINIC | Age: 24
End: 2019-03-05

## 2019-03-05 DIAGNOSIS — B00.1 RECURRENT COLD SORES: Primary | ICD-10-CM

## 2019-03-05 RX ORDER — VALACYCLOVIR HYDROCHLORIDE 1 G/1
2000 TABLET, FILM COATED ORAL EVERY 12 HOURS
Qty: 8 TABLET | Refills: 2 | Status: SHIPPED | OUTPATIENT
Start: 2019-03-05 | End: 2019-06-19 | Stop reason: SDUPTHER

## 2019-03-05 NOTE — TELEPHONE ENCOUNTER
Please call mom and let her know that I [facility] her Valt[facility]  However [facility] is listed as her main pharmacy in chart so we may want to check with mom to see what pharmacy is preference and update it as you said for me to send this to [facility]

## 2019-03-05 NOTE — TELEPHONE ENCOUNTER
Pt's mom Maren Jc called whom is on pt's consent stating Nehal Kendrick needs a refill on her Valtrex but can not find the bottle to confirm pt's current dose  In [facility] pt was prescribed Valacyclovir 1 gm take 2 tablets every 12 hours   In Epic it was pulled in as 1,000 mg which equal 1 GM     Last prescribed 9/14/2017 by Seth Bowman    Pt last had a med check in the office on 3/26/2018 no future appointments are scheduled at this time  Seth Bowman would  you refill pt's Valtrex? Pt uses the [facility] on [address]  I will gladly charley up for you   Any questions please contact Maren Hosea at [phone]

## 2019-03-05 NOTE — TELEPHONE ENCOUNTER
I called and spoke to pt's mom Delfino Shafer informed Chance Rivera sent Valtrex to [facility] per pt's mom I did remove [facility] since she no longer will be using it  Did confirm primary pharmacy is [facility]

## 2019-06-19 DIAGNOSIS — B00.1 RECURRENT COLD SORES: ICD-10-CM

## 2019-06-20 RX ORDER — VALACYCLOVIR HYDROCHLORIDE 1 G/1
TABLET, FILM COATED ORAL
Qty: 8 TABLET | Refills: 0 | Status: SHIPPED | OUTPATIENT
Start: 2019-06-20 | End: 2019-10-22 | Stop reason: SDUPTHER

## 2019-07-27 DIAGNOSIS — Z30.9 ENCOUNTER FOR CONTRACEPTIVE MANAGEMENT, UNSPECIFIED TYPE: ICD-10-CM

## 2019-07-29 RX ORDER — DESOGESTREL AND ETHINYL ESTRADIOL 0.15-0.03
KIT ORAL
Qty: 28 TABLET | Refills: 0 | Status: SHIPPED | OUTPATIENT
Start: 2019-07-29 | End: 2019-09-03 | Stop reason: SDUPTHER

## 2019-09-03 DIAGNOSIS — Z30.9 ENCOUNTER FOR CONTRACEPTIVE MANAGEMENT, UNSPECIFIED TYPE: ICD-10-CM

## 2019-09-03 RX ORDER — DESOGESTREL AND ETHINYL ESTRADIOL 0.15-0.03
KIT ORAL
Qty: 28 TABLET | Refills: 0 | Status: SHIPPED | OUTPATIENT
Start: 2019-09-03 | End: 2019-09-29 | Stop reason: SDUPTHER

## 2019-09-29 DIAGNOSIS — Z30.9 ENCOUNTER FOR CONTRACEPTIVE MANAGEMENT, UNSPECIFIED TYPE: ICD-10-CM

## 2019-09-29 RX ORDER — DESOGESTREL AND ETHINYL ESTRADIOL 0.15-0.03
KIT ORAL
Qty: 28 TABLET | Refills: 0 | Status: SHIPPED | OUTPATIENT
Start: 2019-09-29 | End: 2019-11-01

## 2019-09-30 DIAGNOSIS — Z30.9 ENCOUNTER FOR CONTRACEPTIVE MANAGEMENT, UNSPECIFIED TYPE: ICD-10-CM

## 2019-09-30 RX ORDER — DESOGESTREL AND ETHINYL ESTRADIOL 0.15-0.03
KIT ORAL
Qty: 28 TABLET | Refills: 0 | Status: SHIPPED | OUTPATIENT
Start: 2019-09-30 | End: 2019-11-01 | Stop reason: SDUPTHER

## 2019-10-22 DIAGNOSIS — B00.1 RECURRENT COLD SORES: ICD-10-CM

## 2019-10-22 RX ORDER — VALACYCLOVIR HYDROCHLORIDE 1 G/1
TABLET, FILM COATED ORAL
Qty: 8 TABLET | Refills: 0 | Status: SHIPPED | OUTPATIENT
Start: 2019-10-22 | End: 2019-11-01 | Stop reason: SDUPTHER

## 2019-11-01 ENCOUNTER — OFFICE VISIT (OUTPATIENT)
Dept: INTERNAL MEDICINE CLINIC | Facility: CLINIC | Age: 24
End: 2019-11-01

## 2019-11-01 ENCOUNTER — TELEPHONE (OUTPATIENT)
Dept: INTERNAL MEDICINE CLINIC | Facility: CLINIC | Age: 24
End: 2019-11-01

## 2019-11-01 VITALS
WEIGHT: 154.76 LBS | BODY MASS INDEX: 24.87 KG/M2 | SYSTOLIC BLOOD PRESSURE: 100 MMHG | HEIGHT: 66 IN | DIASTOLIC BLOOD PRESSURE: 74 MMHG | HEART RATE: 72 BPM | TEMPERATURE: 97.9 F

## 2019-11-01 DIAGNOSIS — F32.A DEPRESSION, UNSPECIFIED DEPRESSION TYPE: ICD-10-CM

## 2019-11-01 DIAGNOSIS — Z01.419 ROUTINE GYNECOLOGICAL EXAMINATION: ICD-10-CM

## 2019-11-01 DIAGNOSIS — M54.50 ACUTE BILATERAL LOW BACK PAIN WITHOUT SCIATICA: ICD-10-CM

## 2019-11-01 DIAGNOSIS — B36.0 TINEA VERSICOLOR: ICD-10-CM

## 2019-11-01 DIAGNOSIS — Z30.9 ENCOUNTER FOR CONTRACEPTIVE MANAGEMENT, UNSPECIFIED TYPE: Primary | ICD-10-CM

## 2019-11-01 DIAGNOSIS — Z23 NEED FOR TDAP VACCINATION: ICD-10-CM

## 2019-11-01 DIAGNOSIS — B00.1 RECURRENT COLD SORES: ICD-10-CM

## 2019-11-01 PROCEDURE — 90715 TDAP VACCINE 7 YRS/> IM: CPT | Performed by: PHYSICIAN ASSISTANT

## 2019-11-01 PROCEDURE — 3008F BODY MASS INDEX DOCD: CPT | Performed by: PHYSICIAN ASSISTANT

## 2019-11-01 PROCEDURE — 90471 IMMUNIZATION ADMIN: CPT | Performed by: PHYSICIAN ASSISTANT

## 2019-11-01 PROCEDURE — 99204 OFFICE O/P NEW MOD 45 MIN: CPT | Performed by: PHYSICIAN ASSISTANT

## 2019-11-01 RX ORDER — VALACYCLOVIR HYDROCHLORIDE 1 G/1
2000 TABLET, FILM COATED ORAL 2 TIMES DAILY
Qty: 8 TABLET | Refills: 2 | Status: SHIPPED | OUTPATIENT
Start: 2019-11-01 | End: 2021-06-04

## 2019-11-01 RX ORDER — KETOCONAZOLE 20 MG/G
CREAM TOPICAL DAILY
Qty: 60 G | Refills: 2 | Status: SHIPPED | OUTPATIENT
Start: 2019-11-01 | End: 2020-06-11

## 2019-11-01 RX ORDER — DESOGESTREL AND ETHINYL ESTRADIOL 0.15-0.03
1 KIT ORAL DAILY
Qty: 28 TABLET | Refills: 5 | Status: SHIPPED | OUTPATIENT
Start: 2019-11-01 | End: 2020-04-27

## 2019-11-01 RX ORDER — VALACYCLOVIR HYDROCHLORIDE 500 MG/1
500 TABLET, FILM COATED ORAL DAILY
Qty: 30 TABLET | Refills: 5 | Status: SHIPPED | OUTPATIENT
Start: 2019-11-01 | End: 2020-06-11

## 2019-11-01 RX ORDER — CYCLOBENZAPRINE HCL 10 MG
10 TABLET ORAL
Qty: 30 TABLET | Refills: 0 | Status: SHIPPED | OUTPATIENT
Start: 2019-11-01 | End: 2020-06-15

## 2019-11-01 RX ORDER — MELOXICAM 15 MG/1
15 TABLET ORAL DAILY
Qty: 30 TABLET | Refills: 0 | Status: SHIPPED | OUTPATIENT
Start: 2019-11-01 | End: 2020-06-11

## 2019-11-01 NOTE — ASSESSMENT & PLAN NOTE
Patient asking for refill of Valtrex 1000 mg dosing p r n  cold sore exacerbation  However she notes more frequent cold sores this year  Discussed lower dose Valtrex 500 mg daily for maintenance and prevention of frequent cold sores which both were prescribed for her today

## 2019-11-01 NOTE — PATIENT INSTRUCTIONS
[address]      Birth control- please restart the Sunday after her period starts  Please remember to use condoms consistently whether on birth control or not  Please start Valtrex 500 mg once a day for maintenance and prevention of frequent cold sores, Valtrex 2000 mg twice a day for 1-2 days for outbreak  Meloxicam in the morning with food, cyclobenzaprine muscle relaxer at night before bed for a few weeks as well as warm compresses or heating pad and gentle stretches to the legs and back  Make appointment with gyn to establish and for birth control check      Please call around make appointment with a therapist

## 2019-11-01 NOTE — PROGRESS NOTES
Assessment/Plan:    Recurrent cold sores  Patient asking for refill of Valtrex 1000 mg dosing p r n  cold sore exacerbation  However she notes more frequent cold sores this year  Discussed lower dose Valtrex 500 mg daily for maintenance and prevention of frequent cold sores which both were prescribed for her today  Uses oral contraception  Birth control med check performed today as I had previously prescribed this for her at my old office as well as performed her gyn exam and Pap  She is up-to-date with Pap performed 2018 which was normal   She will be due in 3 years  However I did refer her to gyn to establish for ongoing birth control maintenance and future cervical cancer screening  She also consider STD testing if warranted  Depression  Patient overall noting some improvement with her depression  She notes that she has new friends to her new job as well as a very supportive new boyfriend for the past 9 months  She does have days where she does not want to get out of bed which even diet with good days unfortunately  I do not feel medication is appropriate at this time and I do believe we did try medication in the past but she decided to stop it  I have encouraged her to consider finding a therapist which has also been discussed with her in the past   Patient given list of mental health locations that she can check into with her insurance  Diagnoses and all orders for this visit:    Encounter for contraceptive management, unspecified type  -     desogestrel-ethinyl estradiol (ISIBLOOM) 0 15-30 MG-MCG per tablet; Take 1 tablet by mouth daily    Recurrent cold sores  -     valACYclovir (VALTREX) 1,000 mg tablet; Take 2 tablets (2,000 mg total) by mouth 2 (two) times a day for 2 days  -     valACYclovir (VALTREX) 500 mg tablet;  Take 1 tablet (500 mg total) by mouth daily    Depression, unspecified depression type    Acute bilateral low back pain without sciatica  -     meloxicam (MOBIC) 15 mg tablet; Take 1 tablet (15 mg total) by mouth daily With food  -     cyclobenzaprine (FLEXERIL) 10 mg tablet; Take 1 tablet (10 mg total) by mouth daily at bedtime    Tinea versicolor  -     ketoconazole (NIZORAL) 2 % cream; Apply topically daily    Need for Tdap vaccination  -     TDAP VACCINE GREATER THAN OR EQUAL TO 6YO IM    Routine gynecological examination  -     Ambulatory referral to Obstetrics / Gynecology; Future      Patient is a pleasant 23y/o female presenting today to establish, she is a former patient of mine at my previous office  Assessment and plan and discussion of her chronic conditions as above  Patient has been dealing with acute low back pain mainly on the left side that she attributes to standing long periods at her job  I do not feel any x-rays are necessary  Most likely muscular as she also has tightness in her left hamstring as well  I will start her on a 2-3 week course of meloxicam once a day with food as well as cyclobenzaprine at night with side effects of both discussed  I have advised moist heat or heating pad to the low back in addition to gentle stretches to the lumbar spine as well as the bilateral quadriceps and hamstrings which certainly poor flexibility in these areas can affect the back  Patient expresses understanding and will see how she does with the course of the next 4-6 weeks  Patient also has a rash noted for the past 1-2 months on her upper chest, neck and upper back most consistent with tinea versicolor  Ketoconazole cream applied twice daily to the affected areas was prescribed and sent to her pharmacy  Tdap updated today  Patient declines flu vaccine despite recommendations  Patient has been referred to gyn to establish  I will see her back in about 4-6 months for another follow-up, sooner if any problems arise  Chief Complaint   Patient presents with    Establish Care       Subjective:      Patient ID: Gallo Choudhary is a 27 y o  female  23y/o female here today to establish, she is a former patient of mine at previous office [facility]  She needs refill of her birth control medication  She has been out for 1 week  Pt used to see me at my other office for GYN care/PAP and birth control refills and never established with new GYN after I left  She is asking for referral to GYN and refill of medication  She states this year has been bad for cold sores  She states most recent outbreak started last week  Had about 6 outbreaks this year so far which is more than what she has had in past  She attributes it to stress of work and school, as well as dry lips  She also admits to rash on upper chest, neck, back of shoulders and upper back x 1-2 months  Sometimes itchy  No known triggers  Currently in monogamous sexual relationship with current boyfriend x 9 months, consistent condom use  No pain during intercourse or pelvic pain, vaginal or urinary sxs  She has hx of depression  States she overall is doing well  She has noted new friends and has made things better  She states she has ups and down but overall doing well  She states there are some days she doesn't want to get out of bed, even with good days  She does c/o some low back pain left side sometimes wrapping around into left hip and groin and into buttocks and down posterior thigh  She notes this past few months, espeically at work standing long periods  The following portions of the patient's history were reviewed and updated as appropriate: allergies, current medications, past family history, past medical history, past social history, past surgical history and problem list     Review of Systems   Constitutional: Negative  HENT: Negative  Respiratory: Negative  Cardiovascular: Negative  Gastrointestinal: Negative  Genitourinary: Negative           Periods were regular while on oral BC   Musculoskeletal:        As in HPI   Skin:        As in HPI Neurological:        As in HPI   Psychiatric/Behavioral:        As in HPI         Objective:      /74 (BP Location: Right arm, Patient Position: Sitting, Cuff Size: Standard)   Pulse 72   Temp 97 9 °F (36 6 °C) (Oral)   Ht 5' 6" (1 676 m)   Wt 70 2 kg (154 lb 12 2 oz)   BMI 24 98 kg/m²          Physical Exam   Constitutional: She is oriented to person, place, and time  She appears well-developed and well-nourished  No distress  Neck: Normal range of motion and phonation normal  Neck supple  Normal carotid pulses present  No spinous process tenderness and no muscular tenderness present  Carotid bruit is not present  No thyroid mass present  Cardiovascular: Normal rate, regular rhythm, normal heart sounds and normal pulses  Pulmonary/Chest: Effort normal and breath sounds normal    Abdominal: Soft  Normal appearance and bowel sounds are normal  There is no tenderness  Musculoskeletal:        Lumbar back: She exhibits tenderness  She exhibits no swelling, no edema and no spasm  Back:    Patient has full range of motion of her lumbar spine in all directions with minimal discomfort in the left low back  She does have poor hamstring flexibility noted on the left side with examination of the hip  She does not demonstrate any instability or exquisite pain with left hip flexion, extension, abduction or adduction  Lymphadenopathy:        Head (right side): No submandibular and no tonsillar adenopathy present  Head (left side): No submandibular and no tonsillar adenopathy present  Neurological: She is alert and oriented to person, place, and time  Coordination and gait normal    Skin:   Right upper lip with scabbing associated with herpes cold sore  Multiple faint erythematous patches, mainly circular or oval in nature, slightly velvety, consistent with tinea versicolor on neck, upper chest and upper back  Psychiatric: She has a normal mood and affect   Her speech is normal and behavior is normal  Thought content normal  She expresses no homicidal and no suicidal ideation  She expresses no suicidal plans and no homicidal plans  Noncommunicative: Unk Iron Vitals reviewed

## 2019-11-01 NOTE — ASSESSMENT & PLAN NOTE
Patient overall noting some improvement with her depression  She notes that she has new friends to her new job as well as a very supportive new boyfriend for the past 9 months  She does have days where she does not want to get out of bed which even diet with good days unfortunately  I do not feel medication is appropriate at this time and I do believe we did try medication in the past but she decided to stop it  I have encouraged her to consider finding a therapist which has also been discussed with her in the past   Patient given list of mental health locations that she can check into with her insurance

## 2019-11-01 NOTE — ASSESSMENT & PLAN NOTE
Birth control med check performed today as I had previously prescribed this for her at my old office as well as performed her gyn exam and Pap  She is up-to-date with Pap performed 2018 which was normal   She will be due in 3 years  However I did refer her to gyn to establish for ongoing birth control maintenance and future cervical cancer screening  She also consider STD testing if warranted

## 2019-11-01 NOTE — TELEPHONE ENCOUNTER
Please call pt - I forgot to send in cream for the rash - I sent it into pharmacy for her called ketoconazole she will apply to rash twice a day for 1-2 weeks  TY I have a few refills on medication

## 2020-04-26 DIAGNOSIS — Z30.9 ENCOUNTER FOR CONTRACEPTIVE MANAGEMENT, UNSPECIFIED TYPE: ICD-10-CM

## 2020-04-27 RX ORDER — DESOGESTREL AND ETHINYL ESTRADIOL 0.15-0.03
KIT ORAL
Qty: 84 TABLET | Refills: 3 | Status: SHIPPED | OUTPATIENT
Start: 2020-04-27 | End: 2021-03-17

## 2020-06-08 ENCOUNTER — TELEPHONE (OUTPATIENT)
Dept: INTERNAL MEDICINE CLINIC | Facility: CLINIC | Age: 25
End: 2020-06-08

## 2020-06-11 ENCOUNTER — OFFICE VISIT (OUTPATIENT)
Dept: INTERNAL MEDICINE CLINIC | Facility: CLINIC | Age: 25
End: 2020-06-11

## 2020-06-11 VITALS
DIASTOLIC BLOOD PRESSURE: 72 MMHG | HEART RATE: 62 BPM | BODY MASS INDEX: 24.87 KG/M2 | WEIGHT: 154.76 LBS | SYSTOLIC BLOOD PRESSURE: 112 MMHG | TEMPERATURE: 97.8 F | HEIGHT: 66 IN

## 2020-06-11 DIAGNOSIS — Z30.41 USES ORAL CONTRACEPTION: ICD-10-CM

## 2020-06-11 DIAGNOSIS — B00.1 RECURRENT COLD SORES: ICD-10-CM

## 2020-06-11 DIAGNOSIS — F32.A DEPRESSION, UNSPECIFIED DEPRESSION TYPE: Primary | ICD-10-CM

## 2020-06-11 DIAGNOSIS — M54.50 INTERMITTENT LOW BACK PAIN: ICD-10-CM

## 2020-06-11 DIAGNOSIS — Z11.4 SCREENING FOR HIV (HUMAN IMMUNODEFICIENCY VIRUS): ICD-10-CM

## 2020-06-11 PROCEDURE — 99214 OFFICE O/P EST MOD 30 MIN: CPT | Performed by: PHYSICIAN ASSISTANT

## 2020-06-11 PROCEDURE — 1036F TOBACCO NON-USER: CPT | Performed by: PHYSICIAN ASSISTANT

## 2020-06-11 PROCEDURE — 3008F BODY MASS INDEX DOCD: CPT | Performed by: PHYSICIAN ASSISTANT

## 2020-06-15 DIAGNOSIS — M54.50 ACUTE BILATERAL LOW BACK PAIN WITHOUT SCIATICA: ICD-10-CM

## 2020-06-15 RX ORDER — CYCLOBENZAPRINE HCL 10 MG
TABLET ORAL
Qty: 30 TABLET | Refills: 0 | Status: SHIPPED | OUTPATIENT
Start: 2020-06-15 | End: 2020-09-16

## 2020-09-16 DIAGNOSIS — M54.50 ACUTE BILATERAL LOW BACK PAIN WITHOUT SCIATICA: ICD-10-CM

## 2020-09-16 RX ORDER — CYCLOBENZAPRINE HCL 10 MG
TABLET ORAL
Qty: 30 TABLET | Refills: 0 | Status: SHIPPED | OUTPATIENT
Start: 2020-09-16 | End: 2021-03-17

## 2021-01-22 ENCOUNTER — TELEMEDICINE (OUTPATIENT)
Dept: INTERNAL MEDICINE CLINIC | Facility: CLINIC | Age: 26
End: 2021-01-22

## 2021-01-22 ENCOUNTER — TELEPHONE (OUTPATIENT)
Dept: INTERNAL MEDICINE CLINIC | Facility: CLINIC | Age: 26
End: 2021-01-22

## 2021-01-22 VITALS — HEIGHT: 66 IN | BODY MASS INDEX: 25.71 KG/M2 | WEIGHT: 160 LBS

## 2021-01-22 DIAGNOSIS — R10.13 EPIGASTRIC PAIN: ICD-10-CM

## 2021-01-22 DIAGNOSIS — F12.90 MARIJUANA USE, CONTINUOUS: ICD-10-CM

## 2021-01-22 DIAGNOSIS — R11.2 INTRACTABLE VOMITING WITH NAUSEA, UNSPECIFIED VOMITING TYPE: Primary | ICD-10-CM

## 2021-01-22 DIAGNOSIS — R07.9 CHEST PAIN, UNSPECIFIED TYPE: ICD-10-CM

## 2021-01-22 PROCEDURE — 99214 OFFICE O/P EST MOD 30 MIN: CPT | Performed by: PHYSICIAN ASSISTANT

## 2021-01-22 PROCEDURE — 1036F TOBACCO NON-USER: CPT | Performed by: PHYSICIAN ASSISTANT

## 2021-01-22 RX ORDER — ONDANSETRON 4 MG/1
4 TABLET, ORALLY DISINTEGRATING ORAL EVERY 8 HOURS PRN
COMMUNITY
Start: 2021-01-20 | End: 2021-06-04

## 2021-01-22 RX ORDER — PROMETHAZINE HYDROCHLORIDE 25 MG/1
25 SUPPOSITORY RECTAL EVERY 6 HOURS PRN
Qty: 12 EACH | Refills: 1 | Status: SHIPPED | OUTPATIENT
Start: 2021-01-22 | End: 2021-02-02

## 2021-01-22 RX ORDER — SUCRALFATE 1 G/1
1 TABLET ORAL 3 TIMES DAILY
COMMUNITY
Start: 2021-01-21 | End: 2021-02-16 | Stop reason: SDUPTHER

## 2021-01-22 RX ORDER — TRAMADOL HYDROCHLORIDE 50 MG/1
50 TABLET ORAL EVERY 8 HOURS PRN
Qty: 10 TABLET | Refills: 0 | Status: SHIPPED | OUTPATIENT
Start: 2021-01-22 | End: 2021-01-29 | Stop reason: SDUPTHER

## 2021-01-22 RX ORDER — OMEPRAZOLE 40 MG/1
40 CAPSULE, DELAYED RELEASE ORAL DAILY
Qty: 21 CAPSULE | Refills: 0 | Status: SHIPPED | OUTPATIENT
Start: 2021-01-22 | End: 2021-02-16

## 2021-01-22 NOTE — TELEPHONE ENCOUNTER
Staff, can we please attempt to schedule patient for ASAP Gastroenterology visit new patient establish for persistent nausea with vomiting, chest pain and epigastric pain? She lives in [facility] so [facility] or [facility] may be better however I am sure they are willing to drive to [facility] since we are their PCP  Seen at urgent care and ED twice through [facility], cannot keep food or drink down, cannot keep medicine down including Zofran and Carafate prescribed in the ED  I prescribed Phenergan suppository, omeprazole 40 once a day and tramadol for short-term use only for the pain during visit on 01/22      Referral in computer

## 2021-01-22 NOTE — PROGRESS NOTES
Virtual Regular Visit      Assessment/Plan:    Problem List Items Addressed This Visit        Other    Marijuana use, continuous      Other Visit Diagnoses     Intractable vomiting with nausea, unspecified vomiting type    -  Primary    Relevant Medications    promethazine (PHENERGAN) 25 mg suppository    omeprazole (PriLOSEC) 40 MG capsule    Other Relevant Orders    Ambulatory referral to Gastroenterology    Epigastric pain        Relevant Medications    traMADol (ULTRAM) 50 mg tablet    omeprazole (PriLOSEC) 40 MG capsule    Other Relevant Orders    Ambulatory referral to Gastroenterology    Chest pain, unspecified type        Relevant Medications    traMADol (ULTRAM) 50 mg tablet    omeprazole (PriLOSEC) 40 MG capsule    Other Relevant Orders    Ambulatory referral to Gastroenterology           24y/o female presenting today for ongoing gi sxs despite 1 UC visit and 2 ED visits for same, Tx with IM toradol x 2 with relief, carafate, pepcid, zofran, unable to keep medications down  This all reportedly started after a night of drinking ETOH and what was though to be vomiting and hang over the day after  Also pt smokes marijuana every day, at least once a day  Suspected possible cyclical vomiting syndrome, though first time pt experiencing this, also considering esophagitis/gastritis  Less likely cholelithiasis as she would not typically experience chest/esophageal pain and intractable vomiting  At this point GI referral is needed for persistent sxs, may need EGD  Referral made and staff to assist with ASAP scheduling  Will attempt suppository phenergan to calm N/V in hopes she can then tolerate liquids to take carafate in addition to PI omeprazole 40mg, TID and QD, respectively  Mom asking for something for pain, States IM toradol helped but can only give oral outpatient and would like to avoid all NSAIDS   Tramadol 50mg BID prn severe pain only given short term, pt expresses understanding one time prescription only  Expresses understanding this med relieves pain but does not fix underlying problem  Potential side effects including constipation discussed  BRAT diet, focusing on clear fluids, as well as jello, broth, ice, ice pops, water and other clear liquids ok  Also complete cessation of ETOH and marijuana advised  ED precautions discussed over the weekend should sxs worsen or she seem weak/dehydrated  Should not wait for outpatient eval     Reason for visit is   Chief Complaint   Patient presents with    Virtual Brief Visit     esophagus pain ,vomiting 7 times today everything the she eats ,water and stomach acids     Virtual Regular Visit        Encounter provider Yudy Cheema PA-C    [facility]  [address] [address]  [address]  [address] [facility] [address] [phone]      Recent Visits  Date Type Provider Dept   01/22/21 Telephone Yudy Cheema, [address] [facility]   01/22/21 [address] [facility], [address] [place] [address] recent visits within past 7 days and meeting all other requirements     Future Appointments  No visits were found meeting these conditions  Showing future appointments within next 150 days and meeting all other requirements        The patient was identified by name and date of birth  Brigid Mayfield was informed that this is a telemedicine visit and that the visit is being conducted through [facility] and patient was informed that this is a secure, HIPAA-compliant platform  She agrees to proceed     My office door was closed  No one else was in the room  She acknowledged consent and understanding of privacy and security of the video platform  The patient has agreed to participate and understands they can discontinue the visit at any time  Patient is aware this is a billable service       Subjective  Brigid Mayfield is a 23 y o  female presenting for ongoing GI sxs        24y/o female here today for virtual visit for 6 days of N/V, chest and abdominal pain  States was out drinking 1/16 and next morning went home and multiple vomiting episodes  States had 4 mixed drinks and 2 beers the night before sxs started  Then felt fine sunday and Monday Tuesday 1/19 started with N/V multiple episodes with pain and again similar sxs Wednesday  Went to urgent care at [facility] 1/20 and was given IM toradol, zofran but was advised to got to ED r/o dehydration  Sxs improved so was d/c'd but  Then went to ED yesterday again due to sxs, readmin toradol IM, carafate and tylenol  Per documentation Suspected cannibus hyperemesis, as pt smoked 3 blunts after d/c 1/20 and vomiting restarted  Also gastritis considered  She continues with chest/epigastric pain, that she states triggers the nausea, then vomiting  She has been taking zofran and carafate, but unable to keep anything down for meds to work  States she hasnt smoked marijuana in about 3 days, but generally smokes daily, sometimes 1-3 x a day  States her entire esophagus as well as epigastric region is painful  Pain comes and goes "every 3 seconds"  Talking and breathing make it woe, also triggered by eating or drinking anything  Described as a pulsating pain  No fevers, chills only after vomiting  Vomited 7-8 x today  Pain is worse with solids and liquids  States takes tiniest sips of water and immediately feels pain in esophagus into stomach and vomits shortly after  Stools are soft, no diarrhea, but green in color  Feels like she cannot take a deep breath but does not feel SOB  Denies blood in the vomit, denies melena or bright red blood in the poop  BW from  ED reviewed, showing normal CBC, mild low K+ 3 3, low lipase 52, ALT mildly elevated 59, AST WNL  STD screen neg  Urine with 250-499 leuks suspected secondary to dehydration  History reviewed  No pertinent past medical history      Past Surgical History:   Procedure Laterality Date    TOOTH EXTRACTION         Current Outpatient Medications   Medication Sig Dispense Refill    cyclobenzaprine (FLEXERIL) 10 mg tablet TAKE 1 TABLET BY MOUTH DAILY AT BEDTIME 30 tablet 0    ISIBLOOM 0 15-30 MG-MCG per tablet TAKE 1 TABLET BY MOUTH DAILY 84 tablet 3    ondansetron (ZOFRAN-ODT) 4 mg disintegrating tablet Take 4 mg by mouth every 8 (eight) hours as needed      sucralfate (CARAFATE) 1 g tablet Take 1 g by mouth Three times a day      omeprazole (PriLOSEC) 40 MG capsule Take 1 capsule (40 mg total) by mouth daily 21 capsule 0    promethazine (PHENERGAN) 25 mg suppository Insert 1 suppository (25 mg total) into the rectum every 6 (six) hours as needed for nausea or vomiting 12 each 1    traMADol (ULTRAM) 50 mg tablet Take 1 tablet (50 mg total) by mouth every 8 (eight) hours as needed for severe pain 10 tablet 0    valACYclovir (VALTREX) 1,000 mg tablet Take 2 tablets (2,000 mg total) by mouth 2 (two) times a day for 2 days 8 tablet 2     No current facility-administered medications for this visit  Allergies   Allergen Reactions    Pollen Extract        Review of Systems   Constitutional: Positive for activity change, appetite change, chills and fatigue  Negative for fever  HENT: Negative  Respiratory: Negative  Cardiovascular: Negative  Gastrointestinal:        As In HPI   Genitourinary: Negative  Neurological: Negative  Video Exam    Vitals:    01/22/21 1558   Weight: 72 6 kg (160 lb)   Height: 5' 6" (1 676 m)       Physical Exam  Constitutional:       General: She is not in acute distress  Appearance: She is ill-appearing  She is not toxic-appearing or diaphoretic  HENT:      Head: Normocephalic and atraumatic  Pulmonary:      Effort: Pulmonary effort is normal    Abdominal:      Comments: Pt showing me pain from esophagus down chest into epigastric region  She can palpate area without significant reproducible pain  Neurological:      Mental Status: She is alert and oriented to person, place, and time  Psychiatric:         Mood and Affect: Mood normal          Behavior: Behavior normal           I spent 20 minutes directly with the patient during this visit      VIRTUAL VISIT DISCLAIMER    Barb Urbano acknowledges that she has consented to an online visit or consultation  She understands that the online visit is based solely on information provided by her, and that, in the absence of a face-to-face physical evaluation by the physician, the diagnosis she receives is both limited and provisional in terms of accuracy and completeness  This is not intended to replace a full medical face-to-face evaluation by the physician  Barb Clement understands and accepts these terms

## 2021-01-24 PROBLEM — F12.90 MARIJUANA USE, CONTINUOUS: Status: ACTIVE | Noted: 2021-01-24

## 2021-01-25 NOTE — TELEPHONE ENCOUNTER
Great work, thanks stanislav  Staff, can we please reach out to patient to triage sxs and see how she has been feeling since virtual visit last Friday? Are meds helping? Is she able to eat/drink anything? hows abd pain?

## 2021-01-25 NOTE — TELEPHONE ENCOUNTER
Patient is schedule 01/28/2021 at [facility]  Patient made aware of appointment information

## 2021-01-25 NOTE — TELEPHONE ENCOUNTER
Called and spoke with patient  Patient reports, "I feel like a new person "  She states the medications are working  She has been able to eat and drink juice, eggs, rice  She only has some abdominal pain if she over eats, which is easy for her to do since she states she has not been eating much lately  Patient is aware of up coming Gastroenterology appointment and sh is aware to attend appointment even though she is feeling better

## 2021-01-28 ENCOUNTER — TELEPHONE (OUTPATIENT)
Dept: OTHER | Facility: OTHER | Age: 26
End: 2021-01-28

## 2021-01-28 ENCOUNTER — OFFICE VISIT (OUTPATIENT)
Dept: GASTROENTEROLOGY | Facility: CLINIC | Age: 26
End: 2021-01-28
Payer: COMMERCIAL

## 2021-01-28 ENCOUNTER — TELEPHONE (OUTPATIENT)
Dept: GASTROENTEROLOGY | Facility: CLINIC | Age: 26
End: 2021-01-28

## 2021-01-28 ENCOUNTER — PREP FOR PROCEDURE (OUTPATIENT)
Dept: GASTROENTEROLOGY | Facility: CLINIC | Age: 26
End: 2021-01-28

## 2021-01-28 VITALS
BODY MASS INDEX: 23.11 KG/M2 | WEIGHT: 143.8 LBS | SYSTOLIC BLOOD PRESSURE: 120 MMHG | TEMPERATURE: 98.3 F | DIASTOLIC BLOOD PRESSURE: 90 MMHG | HEART RATE: 110 BPM | HEIGHT: 66 IN

## 2021-01-28 DIAGNOSIS — R10.13 EPIGASTRIC PAIN: Primary | ICD-10-CM

## 2021-01-28 DIAGNOSIS — R07.9 CHEST PAIN, UNSPECIFIED TYPE: ICD-10-CM

## 2021-01-28 DIAGNOSIS — R10.13 EPIGASTRIC PAIN: ICD-10-CM

## 2021-01-28 DIAGNOSIS — R11.2 INTRACTABLE VOMITING WITH NAUSEA, UNSPECIFIED VOMITING TYPE: ICD-10-CM

## 2021-01-28 DIAGNOSIS — R11.2 INTRACTABLE VOMITING WITH NAUSEA: ICD-10-CM

## 2021-01-28 PROCEDURE — 3008F BODY MASS INDEX DOCD: CPT | Performed by: PHYSICIAN ASSISTANT

## 2021-01-28 PROCEDURE — 99204 OFFICE O/P NEW MOD 45 MIN: CPT | Performed by: PHYSICIAN ASSISTANT

## 2021-01-28 RX ORDER — DICYCLOMINE HYDROCHLORIDE 10 MG/1
10 CAPSULE ORAL 4 TIMES DAILY PRN
Qty: 30 CAPSULE | Refills: 2 | Status: SHIPPED | OUTPATIENT
Start: 2021-01-28 | End: 2021-03-17

## 2021-01-28 NOTE — LETTER
January 28, 2021     [address]a [address]ne [id number] [facility] [address]  [address] [facility]    Patient: Brigid Mayfield   YOB: 1995   Date of Visit: 1/28/2021       Dear Dr Jacob Wiley: Thank you for referring Brigid Mayfield to me for evaluation  Below are my notes for this consultation  If you have questions, please do not hesitate to call me  I look forward to following your patient along with you  Sincerely,        Preston Rosne PA-C        CC: No Recipients  Preston Rosen PA-C  1/28/2021 12:08 PM  Sign when Signing Visit  [facility] - Outpatient Consultation  Brigid Mayfield 23 y o  female MRN: [id number]  Encounter: [id number]          ASSESSMENT AND PLAN:      1  Chest pain, unspecified type  2  Epigastric pain  3  Intractable vomiting with nausea, unspecified vomiting type  She reports acute nausea /vomiting, epigastric pain and chest pain intermittently for the past 12 days since attending a party and drinking mixed alcoholic beverages  She was seen in the emergency room twice for these symptoms and treated supportively with fluid, pain medication, and antiemetics  Blood work showed mild hypokalemia and mild elevation of bilirubin (1 1) and ALT (56)  Otherwise WBC count, hemoglobin, electrolytes, renal function, and lipase were normal  Abdominal imaging was not obtained  I recommend scheduling EGD as soon as possible to evaluate for esophagitis, gastritis, peptic ulcer disease, H pylori infection, celiac disease  We will schedule right upper quadrant ultrasound to assess for cholelithiasis  If EGD and ultrasound are negative, consider CT scan given her weight loss  She will continue using Phenergan suppositories and Zofran as needed  Continue Prilosec daily and Carafate  We discussed that marijuana can cause nausea and vomiting, although more chronically not acutely   We discussed staying on liquid diet and drinking plenty of water, Gatorade, or Pedialyte to stay hydrated  If she notices decreased urination, cannot keep down liquids, dizziness, or lightheadedness she should come to the emergency room  She should avoid reflux trigger foods like coffee, tomatoes, citrus  Avoid alcohol and smoking     - Ambulatory referral to Gastroenterology  - US right upper quadrant; Future  - dicyclomine (BENTYL) 10 mg capsule; Take 1 capsule (10 mg total) by mouth 4 (four) times a day as needed (abdominal pain)  Dispense: 30 capsule; Refill: 2    Follow-up closely after EGD  ______________________________________________________________________    HPI:   24-year-old female presenting for evaluation of acute nausea vomiting, chest pain, and abdominal pain  Patient was at a party 12 days ago and had several mixed alcoholic drinks  She had severe nausea and vomiting the following day  Symptoms subsided by the 2nd and 3rd day, but returned after that  Intermittently  She has burning epigastric pain which radiates into her chest   The pain in her chest feels like a pulsating sensation  This triggers the nausea and vomiting  She has been unable to keep down solid food  She has had about 2-3 solid food meals in the past 2 weeks  She is keeping liquids down  She had a small amount of liquid diarrhea but otherwise not many bowel movements over the past 12 days  She has lost 17 lb in the past 12 days  She denies heartburn and dysphagia  she denies history of chronic GI issues  She was feeling fine prior to onset of symptoms 12 days ago  She does smoke marijuana chronically  REVIEW OF SYSTEMS:    CONSTITUTIONAL: Denies any fever, chills, rigors, and weight loss  HEENT: No earache or tinnitus  Denies hearing loss or visual disturbances  CARDIOVASCULAR: No chest pain or palpitations  RESPIRATORY: Denies any cough, hemoptysis, shortness of breath or dyspnea on exertion  GASTROINTESTINAL: As noted in the History of Present Illness  GENITOURINARY: No problems with urination  Denies any hematuria or dysuria  NEUROLOGIC: No dizziness or vertigo, denies headaches  MUSCULOSKELETAL: Denies any muscle or joint pain  SKIN: Denies skin rashes or itching  ENDOCRINE: Denies excessive thirst  Denies intolerance to heat or cold  PSYCHOSOCIAL: Denies depression or anxiety  Denies any recent memory loss  Historical Information   History reviewed  No pertinent past medical history  Past Surgical History:   Procedure Laterality Date    TOOTH EXTRACTION       Social History   Social History     Substance and Sexual Activity   Alcohol Use No     Social History     Substance and Sexual Activity   Drug Use Yes    Types: Marijuana     Social History     Tobacco Use   Smoking Status Former Smoker    Types: Cigarettes   Smokeless Tobacco Never Used     Family History   Problem Relation Age of Onset    Hypothyroidism Mother     Kidney cancer Maternal Grandmother     Lymphoma Maternal Grandfather     Breast cancer Paternal Grandmother     Lung cancer Paternal Grandfather     Alpha-1 antitrypsin deficiency Family        Meds/Allergies       Current Outpatient Medications:     cyclobenzaprine (FLEXERIL) 10 mg tablet    ISIBLOOM 0 15-30 MG-MCG per tablet    omeprazole (PriLOSEC) 40 MG capsule    promethazine (PHENERGAN) 25 mg suppository    traMADol (ULTRAM) 50 mg tablet    dicyclomine (BENTYL) 10 mg capsule    ondansetron (ZOFRAN-ODT) 4 mg disintegrating tablet    sucralfate (CARAFATE) 1 g tablet    valACYclovir (VALTREX) 1,000 mg tablet    Allergies   Allergen Reactions    Pollen Extract            Objective     Blood pressure 120/90, pulse (!) 110, temperature 98 3 °F (36 8 °C), temperature source Tympanic, height 5' 6" (1 676 m), weight 65 2 kg (143 lb 12 8 oz)  Body mass index is 23 21 kg/m²          PHYSICAL EXAM:      General Appearance:   Alert, cooperative, no distress   HEENT:   Normocephalic, atraumatic, anicteric      Neck:  Supple, symmetrical, trachea midline   Lungs:   Clear to auscultation bilaterally   Heart[de-identified]   Regular rate and rhythm   Abdomen:   Soft,   Nondistended  Normal bowel sounds  Mild diffuse tenderness to palpation  No rebound or guarding  Genitalia:   Deferred    Rectal:   Deferred    Extremities:  No cyanosis, clubbing or edema    Pulses:  2+ and symmetric    Skin:  No jaundice, rashes, or lesions    Lymph nodes:  No palpable cervical lymphadenopathy        Lab Results:   No visits with results within 1 Day(s) from this visit     Latest known visit with results is:   Annual Exam on 08/06/2018   Component Date Value    WBC 08/06/2018 8 16     RBC 08/06/2018 4 53     Hemoglobin 08/06/2018 14 1     Hematocrit 08/06/2018 42 9     MCV 08/06/2018 95     MCH 08/06/2018 31 1     MCHC 08/06/2018 32 9     RDW 08/06/2018 11 9     MPV 08/06/2018 12 1     Platelets 26/65/3974 207     nRBC 08/06/2018 0     Neutrophils Relative 08/06/2018 54     Immat GRANS % 08/06/2018 0     Lymphocytes Relative 08/06/2018 30     Monocytes Relative 08/06/2018 8     Eosinophils Relative 08/06/2018 7*    Basophils Relative 08/06/2018 1     Neutrophils Absolute 08/06/2018 4 41     Immature Grans Absolute 08/06/2018 0 02     Lymphocytes Absolute 08/06/2018 2 45     Monocytes Absolute 08/06/2018 0 67     Eosinophils Absolute 08/06/2018 0 57     Basophils Absolute 08/06/2018 0 04     Sodium 08/06/2018 139     Potassium 08/06/2018 3 8     Chloride 08/06/2018 106     CO2 08/06/2018 27     ANION GAP 08/06/2018 6     BUN 08/06/2018 10     Creatinine 08/06/2018 0 85     Glucose 08/06/2018 71     Calcium 08/06/2018 9 4     AST 08/06/2018 13     ALT 08/06/2018 23     Alkaline Phosphatase 08/06/2018 20*    Total Protein 08/06/2018 7 7     Albumin 08/06/2018 4 0     Total Bilirubin 08/06/2018 0 39     eGFR 08/06/2018 98     TSH 3RD GENERATON 08/06/2018 0 816     Hemoglobin A1C 08/06/2018 5 0     EAG 08/06/2018 97     Case Report 08/06/2018                      Value:Gynecologic Cytology Report                       Case: [id number]                                  Authorizing Provider:  Kiah Gonzalez PA-C    Collected:           08/06/2018 1508              Ordering Location:     [facility]  Received:            08/06/2018 1508                                     Group                                                                        First Screen:          Milton Matamoros, CT                                                       Specimen:    LIQUID-BASED PAP, SCREENING, Cervix                                                        Primary Interpretation 08/06/2018 Negative for intraepithelial lesion or malignancy     Interpretation 08/06/2018 Shift in jordon suggestive of bacterial vaginosis     Specimen Adequacy 08/06/2018 Satisfactory for evaluation  Endocervical/transformation zone component present   Additional Information 08/06/2018                      Value: This result contains rich text formatting which cannot be displayed here   LMP 08/06/2018 7/30/2018     N gonorrhoeae, DNA Probe 08/06/2018 N  gonorrhoeae Amplified DNA Negative     Chlamydia, DNA Probe 08/06/2018 C  trachomatis Amplified DNA Negative          Radiology Results:   No results found

## 2021-01-28 NOTE — PROGRESS NOTES
[facility] - Outpatient Consultation  Clifton Conway 23 y o  female MRN: [id number]  Encounter: [id number]          ASSESSMENT AND PLAN:      1  Chest pain, unspecified type  2  Epigastric pain  3  Intractable vomiting with nausea, unspecified vomiting type  She reports acute nausea /vomiting, epigastric pain and chest pain intermittently for the past 12 days since attending a party and drinking mixed alcoholic beverages  She was seen in the emergency room twice for these symptoms and treated supportively with fluid, pain medication, and antiemetics  Blood work showed mild hypokalemia and mild elevation of bilirubin (1 1) and ALT (56)  Otherwise WBC count, hemoglobin, electrolytes, renal function, and lipase were normal  Chest x-ray was negative  Abdominal imaging was not obtained  I recommend scheduling EGD as soon as possible to evaluate for esophagitis, gastritis, peptic ulcer disease, H pylori infection, celiac disease  We will schedule right upper quadrant ultrasound to assess for cholelithiasis  If EGD and ultrasound are negative, consider CT scan given her weight loss  She will continue using Phenergan suppositories and Zofran as needed  Continue Prilosec daily and Carafate  We discussed that marijuana can cause nausea and vomiting, although more chronically not acutely  We discussed staying on liquid diet and drinking plenty of water, Gatorade, or Pedialyte to stay hydrated  If she notices decreased urination, cannot keep down liquids, dizziness, or lightheadedness she should come to the emergency room  She should avoid reflux trigger foods like coffee, tomatoes, citrus  Avoid alcohol and smoking     - Ambulatory referral to Gastroenterology  - US right upper quadrant; Future  - dicyclomine (BENTYL) 10 mg capsule; Take 1 capsule (10 mg total) by mouth 4 (four) times a day as needed (abdominal pain)  Dispense: 30 capsule;  Refill: 2    Follow-up closely after EGD  ______________________________________________________________________    HPI:   21-year-old female presenting for evaluation of acute nausea vomiting, chest pain, and abdominal pain  Patient was at a party 12 days ago and had several mixed alcoholic drinks  She had severe nausea and vomiting the following day  Symptoms subsided by the 2nd and 3rd day, but returned after that  Intermittently  She has burning epigastric pain which radiates into her chest   The pain in her chest feels like a pulsating sensation  This triggers the nausea and vomiting  She has been unable to keep down solid food  She has had about 2-3 solid food meals in the past 2 weeks  She is keeping liquids down  She had a small amount of liquid diarrhea but otherwise not many bowel movements over the past 12 days  She has lost 17 lb in the past 12 days  She denies heartburn and dysphagia  she denies history of chronic GI issues  She was feeling fine prior to onset of symptoms 12 days ago  She does smoke marijuana chronically  REVIEW OF SYSTEMS:    CONSTITUTIONAL: Denies any fever, chills, rigors, and weight loss  HEENT: No earache or tinnitus  Denies hearing loss or visual disturbances  CARDIOVASCULAR: No chest pain or palpitations  RESPIRATORY: Denies any cough, hemoptysis, shortness of breath or dyspnea on exertion  GASTROINTESTINAL: As noted in the History of Present Illness  GENITOURINARY: No problems with urination  Denies any hematuria or dysuria  NEUROLOGIC: No dizziness or vertigo, denies headaches  MUSCULOSKELETAL: Denies any muscle or joint pain  SKIN: Denies skin rashes or itching  ENDOCRINE: Denies excessive thirst  Denies intolerance to heat or cold  PSYCHOSOCIAL: Denies depression or anxiety  Denies any recent memory loss  Historical Information   History reviewed  No pertinent past medical history    Past Surgical History:   Procedure Laterality Date    TOOTH EXTRACTION       Social History   Social History     Substance and Sexual Activity   Alcohol Use No     Social History     Substance and Sexual Activity   Drug Use Yes    Types: Marijuana     Social History     Tobacco Use   Smoking Status Former Smoker    Types: Cigarettes   Smokeless Tobacco Never Used     Family History   Problem Relation Age of Onset    Hypothyroidism Mother     Kidney cancer Maternal Grandmother     Lymphoma Maternal Grandfather     Breast cancer Paternal Grandmother     Lung cancer Paternal Grandfather     Alpha-1 antitrypsin deficiency Family        Meds/Allergies       Current Outpatient Medications:     cyclobenzaprine (FLEXERIL) 10 mg tablet    ISIBLOOM 0 15-30 MG-MCG per tablet    omeprazole (PriLOSEC) 40 MG capsule    promethazine (PHENERGAN) 25 mg suppository    traMADol (ULTRAM) 50 mg tablet    dicyclomine (BENTYL) 10 mg capsule    ondansetron (ZOFRAN-ODT) 4 mg disintegrating tablet    sucralfate (CARAFATE) 1 g tablet    valACYclovir (VALTREX) 1,000 mg tablet    Allergies   Allergen Reactions    Pollen Extract            Objective     Blood pressure 120/90, pulse (!) 110, temperature 98 3 °F (36 8 °C), temperature source Tympanic, height 5' 6" (1 676 m), weight 65 2 kg (143 lb 12 8 oz)  Body mass index is 23 21 kg/m²  PHYSICAL EXAM:      General Appearance:   Alert, cooperative, no distress   HEENT:   Normocephalic, atraumatic, anicteric      Neck:  Supple, symmetrical, trachea midline   Lungs:   Clear to auscultation bilaterally   Heart[de-identified]   Regular rate and rhythm   Abdomen:   Soft,   Nondistended  Normal bowel sounds  Mild diffuse tenderness to palpation  No rebound or guarding  Genitalia:   Deferred    Rectal:   Deferred    Extremities:  No cyanosis, clubbing or edema    Pulses:  2+ and symmetric    Skin:  No jaundice, rashes, or lesions    Lymph nodes:  No palpable cervical lymphadenopathy        Lab Results:   No visits with results within 1 Day(s) from this visit     Latest known visit with results is:   Annual Exam on 08/06/2018   Component Date Value    WBC 08/06/2018 8 16     RBC 08/06/2018 4 53     Hemoglobin 08/06/2018 14 1     Hematocrit 08/06/2018 42 9     MCV 08/06/2018 95     MCH 08/06/2018 31 1     MCHC 08/06/2018 32 9     RDW 08/06/2018 11 9     MPV 08/06/2018 12 1     Platelets 85/07/1985 207     nRBC 08/06/2018 0     Neutrophils Relative 08/06/2018 54     Immat GRANS % 08/06/2018 0     Lymphocytes Relative 08/06/2018 30     Monocytes Relative 08/06/2018 8     Eosinophils Relative 08/06/2018 7*    Basophils Relative 08/06/2018 1     Neutrophils Absolute 08/06/2018 4 41     Immature Grans Absolute 08/06/2018 0 02     Lymphocytes Absolute 08/06/2018 2 45     Monocytes Absolute 08/06/2018 0 67     Eosinophils Absolute 08/06/2018 0 57     Basophils Absolute 08/06/2018 0 04     Sodium 08/06/2018 139     Potassium 08/06/2018 3 8     Chloride 08/06/2018 106     CO2 08/06/2018 27     ANION GAP 08/06/2018 6     BUN 08/06/2018 10     Creatinine 08/06/2018 0 85     Glucose 08/06/2018 71     Calcium 08/06/2018 9 4     AST 08/06/2018 13     ALT 08/06/2018 23     Alkaline Phosphatase 08/06/2018 20*    Total Protein 08/06/2018 7 7     Albumin 08/06/2018 4 0     Total Bilirubin 08/06/2018 0 39     eGFR 08/06/2018 98     TSH 3RD GENERATON 08/06/2018 0 816     Hemoglobin A1C 08/06/2018 5 0     EAG 08/06/2018 97     Case Report 08/06/2018                      Value:Gynecologic Cytology Report                       Case: [id number]                                  Authorizing Provider:  Iwona Carpio PA-C    Collected:           08/06/2018 1508              Ordering Location:     [facility]  Received:            08/06/2018 1508                                     Group                                                                        First Screen:          KATHARINE Duque Specimen:    LIQUID-BASED PAP, SCREENING, Cervix                                                        Primary Interpretation 08/06/2018 Negative for intraepithelial lesion or malignancy     Interpretation 08/06/2018 Shift in jordon suggestive of bacterial vaginosis     Specimen Adequacy 08/06/2018 Satisfactory for evaluation  Endocervical/transformation zone component present   Additional Information 08/06/2018                      Value: This result contains rich text formatting which cannot be displayed here   LMP 08/06/2018 7/30/2018     N gonorrhoeae, DNA Probe 08/06/2018 N  gonorrhoeae Amplified DNA Negative     Chlamydia, DNA Probe 08/06/2018 C  trachomatis Amplified DNA Negative          Radiology Results:   No results found

## 2021-01-28 NOTE — H&P (VIEW-ONLY)
[facility] - Outpatient Consultation  Barb Clement 23 y o  female MRN: [id number]  Encounter: [id number]          ASSESSMENT AND PLAN:      1  Chest pain, unspecified type  2  Epigastric pain  3  Intractable vomiting with nausea, unspecified vomiting type  She reports acute nausea /vomiting, epigastric pain and chest pain intermittently for the past 12 days since attending a party and drinking mixed alcoholic beverages  She was seen in the emergency room twice for these symptoms and treated supportively with fluid, pain medication, and antiemetics  Blood work showed mild hypokalemia and mild elevation of bilirubin (1 1) and ALT (56)  Otherwise WBC count, hemoglobin, electrolytes, renal function, and lipase were normal  Chest x-ray was negative  Abdominal imaging was not obtained  I recommend scheduling EGD as soon as possible to evaluate for esophagitis, gastritis, peptic ulcer disease, H pylori infection, celiac disease  We will schedule right upper quadrant ultrasound to assess for cholelithiasis  If EGD and ultrasound are negative, consider CT scan given her weight loss  She will continue using Phenergan suppositories and Zofran as needed  Continue Prilosec daily and Carafate  We discussed that marijuana can cause nausea and vomiting, although more chronically not acutely  We discussed staying on liquid diet and drinking plenty of water, Gatorade, or Pedialyte to stay hydrated  If she notices decreased urination, cannot keep down liquids, dizziness, or lightheadedness she should come to the emergency room  She should avoid reflux trigger foods like coffee, tomatoes, citrus  Avoid alcohol and smoking     - Ambulatory referral to Gastroenterology  - US right upper quadrant; Future  - dicyclomine (BENTYL) 10 mg capsule; Take 1 capsule (10 mg total) by mouth 4 (four) times a day as needed (abdominal pain)  Dispense: 30 capsule;  Refill: 2    Follow-up closely after EGD  ______________________________________________________________________    HPI:   28-year-old female presenting for evaluation of acute nausea vomiting, chest pain, and abdominal pain  Patient was at a party 12 days ago and had several mixed alcoholic drinks  She had severe nausea and vomiting the following day  Symptoms subsided by the 2nd and 3rd day, but returned after that  Intermittently  She has burning epigastric pain which radiates into her chest   The pain in her chest feels like a pulsating sensation  This triggers the nausea and vomiting  She has been unable to keep down solid food  She has had about 2-3 solid food meals in the past 2 weeks  She is keeping liquids down  She had a small amount of liquid diarrhea but otherwise not many bowel movements over the past 12 days  She has lost 17 lb in the past 12 days  She denies heartburn and dysphagia  she denies history of chronic GI issues  She was feeling fine prior to onset of symptoms 12 days ago  She does smoke marijuana chronically  REVIEW OF SYSTEMS:    CONSTITUTIONAL: Denies any fever, chills, rigors, and weight loss  HEENT: No earache or tinnitus  Denies hearing loss or visual disturbances  CARDIOVASCULAR: No chest pain or palpitations  RESPIRATORY: Denies any cough, hemoptysis, shortness of breath or dyspnea on exertion  GASTROINTESTINAL: As noted in the History of Present Illness  GENITOURINARY: No problems with urination  Denies any hematuria or dysuria  NEUROLOGIC: No dizziness or vertigo, denies headaches  MUSCULOSKELETAL: Denies any muscle or joint pain  SKIN: Denies skin rashes or itching  ENDOCRINE: Denies excessive thirst  Denies intolerance to heat or cold  PSYCHOSOCIAL: Denies depression or anxiety  Denies any recent memory loss  Historical Information   History reviewed  No pertinent past medical history    Past Surgical History:   Procedure Laterality Date    TOOTH EXTRACTION       Social History   Social History     Substance and Sexual Activity   Alcohol Use No     Social History     Substance and Sexual Activity   Drug Use Yes    Types: Marijuana     Social History     Tobacco Use   Smoking Status Former Smoker    Types: Cigarettes   Smokeless Tobacco Never Used     Family History   Problem Relation Age of Onset    Hypothyroidism Mother     Kidney cancer Maternal Grandmother     Lymphoma Maternal Grandfather     Breast cancer Paternal Grandmother     Lung cancer Paternal Grandfather     Alpha-1 antitrypsin deficiency Family        Meds/Allergies       Current Outpatient Medications:     cyclobenzaprine (FLEXERIL) 10 mg tablet    ISIBLOOM 0 15-30 MG-MCG per tablet    omeprazole (PriLOSEC) 40 MG capsule    promethazine (PHENERGAN) 25 mg suppository    traMADol (ULTRAM) 50 mg tablet    dicyclomine (BENTYL) 10 mg capsule    ondansetron (ZOFRAN-ODT) 4 mg disintegrating tablet    sucralfate (CARAFATE) 1 g tablet    valACYclovir (VALTREX) 1,000 mg tablet    Allergies   Allergen Reactions    Pollen Extract            Objective     Blood pressure 120/90, pulse (!) 110, temperature 98 3 °F (36 8 °C), temperature source Tympanic, height 5' 6" (1 676 m), weight 65 2 kg (143 lb 12 8 oz)  Body mass index is 23 21 kg/m²  PHYSICAL EXAM:      General Appearance:   Alert, cooperative, no distress   HEENT:   Normocephalic, atraumatic, anicteric      Neck:  Supple, symmetrical, trachea midline   Lungs:   Clear to auscultation bilaterally   Heart[de-identified]   Regular rate and rhythm   Abdomen:   Soft,   Nondistended  Normal bowel sounds  Mild diffuse tenderness to palpation  No rebound or guarding  Genitalia:   Deferred    Rectal:   Deferred    Extremities:  No cyanosis, clubbing or edema    Pulses:  2+ and symmetric    Skin:  No jaundice, rashes, or lesions    Lymph nodes:  No palpable cervical lymphadenopathy        Lab Results:   No visits with results within 1 Day(s) from this visit     Latest known visit with results is:   Annual Exam on 08/06/2018   Component Date Value    WBC 08/06/2018 8 16     RBC 08/06/2018 4 53     Hemoglobin 08/06/2018 14 1     Hematocrit 08/06/2018 42 9     MCV 08/06/2018 95     MCH 08/06/2018 31 1     MCHC 08/06/2018 32 9     RDW 08/06/2018 11 9     MPV 08/06/2018 12 1     Platelets 33/75/9364 207     nRBC 08/06/2018 0     Neutrophils Relative 08/06/2018 54     Immat GRANS % 08/06/2018 0     Lymphocytes Relative 08/06/2018 30     Monocytes Relative 08/06/2018 8     Eosinophils Relative 08/06/2018 7*    Basophils Relative 08/06/2018 1     Neutrophils Absolute 08/06/2018 4 41     Immature Grans Absolute 08/06/2018 0 02     Lymphocytes Absolute 08/06/2018 2 45     Monocytes Absolute 08/06/2018 0 67     Eosinophils Absolute 08/06/2018 0 57     Basophils Absolute 08/06/2018 0 04     Sodium 08/06/2018 139     Potassium 08/06/2018 3 8     Chloride 08/06/2018 106     CO2 08/06/2018 27     ANION GAP 08/06/2018 6     BUN 08/06/2018 10     Creatinine 08/06/2018 0 85     Glucose 08/06/2018 71     Calcium 08/06/2018 9 4     AST 08/06/2018 13     ALT 08/06/2018 23     Alkaline Phosphatase 08/06/2018 20*    Total Protein 08/06/2018 7 7     Albumin 08/06/2018 4 0     Total Bilirubin 08/06/2018 0 39     eGFR 08/06/2018 98     TSH 3RD GENERATON 08/06/2018 0 816     Hemoglobin A1C 08/06/2018 5 0     EAG 08/06/2018 97     Case Report 08/06/2018                      Value:Gynecologic Cytology Report                       Case: [id number]                                  Authorizing Provider:  Yari Shell PA-C    Collected:           08/06/2018 1508              Ordering Location:     [address] [place] [address]  Received:            08/06/2018 1508                                     Group                                                                        First Screen:          Zenaida Salamanca, CT Specimen:    LIQUID-BASED PAP, SCREENING, Cervix                                                        Primary Interpretation 08/06/2018 Negative for intraepithelial lesion or malignancy     Interpretation 08/06/2018 Shift in jordon suggestive of bacterial vaginosis     Specimen Adequacy 08/06/2018 Satisfactory for evaluation  Endocervical/transformation zone component present   Additional Information 08/06/2018                      Value: This result contains rich text formatting which cannot be displayed here   LMP 08/06/2018 7/30/2018     N gonorrhoeae, DNA Probe 08/06/2018 N  gonorrhoeae Amplified DNA Negative     Chlamydia, DNA Probe 08/06/2018 C  trachomatis Amplified DNA Negative          Radiology Results:   No results found

## 2021-01-29 ENCOUNTER — TELEPHONE (OUTPATIENT)
Dept: GASTROENTEROLOGY | Facility: CLINIC | Age: 26
End: 2021-01-29

## 2021-01-29 ENCOUNTER — TELEPHONE (OUTPATIENT)
Dept: INTERNAL MEDICINE CLINIC | Facility: CLINIC | Age: 26
End: 2021-01-29

## 2021-01-29 DIAGNOSIS — R07.9 CHEST PAIN, UNSPECIFIED TYPE: ICD-10-CM

## 2021-01-29 DIAGNOSIS — R10.13 EPIGASTRIC PAIN: ICD-10-CM

## 2021-01-29 RX ORDER — TRAMADOL HYDROCHLORIDE 50 MG/1
50 TABLET ORAL EVERY 12 HOURS PRN
Qty: 15 TABLET | Refills: 0 | Status: SHIPPED | OUTPATIENT
Start: 2021-01-29 | End: 2021-02-04 | Stop reason: SDUPTHER

## 2021-01-29 NOTE — TELEPHONE ENCOUNTER
Spoke to patient's mother - she is aware message sent to provider to follow up with her regarding patient's symptoms  Reinforced ED evaluation if unable to tolerate po fluids for hydration

## 2021-01-29 NOTE — TELEPHONE ENCOUNTER
[phone]/Caller(mom)Calista/PT-Eun Gilmore/DINO 8 1995/Jacey is calling very concerned if she should take patient to the ED/ Patient was at visit today and started medication, bentyl, and patient is now having a rapid heartbeat, thank you

## 2021-01-29 NOTE — TELEPHONE ENCOUNTER
PATIENTS MOTHER IS REQUESTING IF YOU CAN PLEASE CALL HER ASAP PLEASE  STATES PATIENT ISN'T  HOLDING ANYTHING DOWN SHE HAS LOST 17LBS SINCE LAST  Monday SHE DID GO TO SEE [facility]STRO 01/28/21 THEY PRESCRIBED HER DICYCLOMINE( SEE TASK TO [facility] 01/29/21) PATIENT WAS IN THE ED YESTERDAY 39/48/70 UNCERTAIN IF IT WAS  DUE TO MEDICATION  DAUGHTER WOKE UP IN PAIN SHE IS VERY CONCERNED AND IS REQUESTING TO SPEAK TO YOU! I DID SPEAK TO TIKA BRIEFLY ABOUT IT AND SHE  ADVICE ME TO ADVICE  HER TO CALL [facility]STR[facility] BACK

## 2021-01-29 NOTE — TELEPHONE ENCOUNTER
I called and spoke to patient  I explained to her that I did everything I could for her last week via virtual visit  She did see [facility] ER early this AM  While I was on the phone mom states GI called and I advised she f/u with them for recommendations, since I am limited to what I can do and I offered all I could last week   Pt states she will f/u with GI and discuss with them if they have anything to recommend prior to her EGD

## 2021-01-29 NOTE — TELEPHONE ENCOUNTER
Patient's mother is aware Tramadol script was sent to her pharmacy  Encouraged her to continue using the phenergan suppositories around the clock so she can tolerate po liquids  Call back with any concerns/ ED if unable to tolerate po fluids for hydration

## 2021-01-29 NOTE — TELEPHONE ENCOUNTER
GI On-Call Note:    Message received  Patient's mother requesting call as she is very concerned about patient who was seen in the office today and was started on Bentyl  Reports patient is now experiencing rapid heartbeat  Chart reviewed  Patient seen in GI office today for epigastric and chest pain as well as nausea and vomiting  Patient was recommended to schedule EGD and was also ordered RUQ US and provided script for Bentyl  Called patient's mother to discuss concerns  Reports patient has been having the aforementioned systems and has trialed several different medications  Was seen today and prescribed Bentyl  Reports that shortly after taking Bentyl, patient started experiencing rapid heart rate (stated that they measured her heart rate and it was in the 120s but sometimes faster)  She also started experiencing tingling around her lips  She denied that patient was having any swelling or difficulty breathing and stated that they checked pulse ox and that reading was okay  I recommended that patient be taken to the emergency room immediately to be evaluated  On chart review, there does not appear to be any NKDA  Palpitations is potential side effect of anticholinergic effect of dicyclomine  Symptoms do not appear consistent with anaphylaxis  Nevertheless, given likely reaction to new medication, I recommended that patient be evaluated in the ED  Patient's mother expressed understanding and agreement  MONTSERRAT Perez  Gastroenterology Fellow  [facility]  Available on [email or web address]

## 2021-01-29 NOTE — TELEPHONE ENCOUNTER
I sent a refill for the Tramadol and I would encourage her to continue using the phenergan suppositories around the clock so she can at least keep liquids down

## 2021-01-29 NOTE — TELEPHONE ENCOUNTER
pts mother called  Pt was prescribed Dicyclomine at office visit in [facility] on 01/28/21  Pt ended up in ER at [facility] with tachycardia last night  Mother would like someone to call back   [phone]

## 2021-01-29 NOTE — TELEPHONE ENCOUNTER
Patient was seen at office visit yesterday for chest pain, epigastric pain, intractable vomiting and was started on dicyclomine  She spoke to on call -Dr Flores Jones and reported rapid heartbeat and tingling around her mouth after taking dicyclomine  He recomened ED evaluation and she was [facility] ED  Patient's mother called today concerned with on going symptoms and ED visits x 3 in the last week and half  Reports 10/10 constant pain in abdomen radiating to esophagus - "pain causes her to vomit"  Phenergan helping some  Unable to keep any food/ liquids down and vomits after taking omeprazole and carafate  Tramadol was helping but she was only prescribed 10 tablets  Scheduled for EGD on Wednesday

## 2021-02-02 ENCOUNTER — ANESTHESIA EVENT (OUTPATIENT)
Dept: GASTROENTEROLOGY | Facility: MEDICAL CENTER | Age: 26
End: 2021-02-02

## 2021-02-02 DIAGNOSIS — R11.2 INTRACTABLE VOMITING WITH NAUSEA, UNSPECIFIED VOMITING TYPE: ICD-10-CM

## 2021-02-02 RX ORDER — PROMETHAZINE HYDROCHLORIDE 25 MG/1
SUPPOSITORY RECTAL
Qty: 12 SUPPOSITORY | Refills: 0 | Status: SHIPPED | OUTPATIENT
Start: 2021-02-02 | End: 2021-02-03 | Stop reason: SDUPTHER

## 2021-02-02 RX ORDER — ONDANSETRON 2 MG/ML
4 INJECTION INTRAMUSCULAR; INTRAVENOUS ONCE AS NEEDED
Status: CANCELLED | OUTPATIENT
Start: 2021-02-02

## 2021-02-03 ENCOUNTER — ANESTHESIA (OUTPATIENT)
Dept: GASTROENTEROLOGY | Facility: MEDICAL CENTER | Age: 26
End: 2021-02-03
Payer: COMMERCIAL

## 2021-02-03 ENCOUNTER — HOSPITAL ENCOUNTER (OUTPATIENT)
Dept: GASTROENTEROLOGY | Facility: MEDICAL CENTER | Age: 26
Setting detail: OUTPATIENT SURGERY
Discharge: HOME | End: 2021-02-03
Attending: INTERNAL MEDICINE | Admitting: INTERNAL MEDICINE
Payer: COMMERCIAL

## 2021-02-03 VITALS
DIASTOLIC BLOOD PRESSURE: 90 MMHG | HEART RATE: 108 BPM | HEIGHT: 66 IN | BODY MASS INDEX: 22.98 KG/M2 | WEIGHT: 143 LBS | RESPIRATION RATE: 16 BRPM | OXYGEN SATURATION: 98 % | SYSTOLIC BLOOD PRESSURE: 141 MMHG | TEMPERATURE: 96.9 F

## 2021-02-03 VITALS — HEART RATE: 126 BPM

## 2021-02-03 DIAGNOSIS — R11.2 INTRACTABLE VOMITING WITH NAUSEA: ICD-10-CM

## 2021-02-03 DIAGNOSIS — R10.13 EPIGASTRIC PAIN: ICD-10-CM

## 2021-02-03 DIAGNOSIS — R11.2 INTRACTABLE VOMITING WITH NAUSEA, UNSPECIFIED VOMITING TYPE: ICD-10-CM

## 2021-02-03 DIAGNOSIS — R07.9 CHEST PAIN, UNSPECIFIED TYPE: ICD-10-CM

## 2021-02-03 LAB
EXT PREGNANCY TEST URINE: NEGATIVE
EXT. CONTROL: NORMAL

## 2021-02-03 PROCEDURE — 43239 EGD BIOPSY SINGLE/MULTIPLE: CPT | Performed by: INTERNAL MEDICINE

## 2021-02-03 PROCEDURE — 88305 TISSUE EXAM BY PATHOLOGIST: CPT | Performed by: PATHOLOGY

## 2021-02-03 PROCEDURE — 81025 URINE PREGNANCY TEST: CPT | Performed by: ANESTHESIOLOGY

## 2021-02-03 RX ORDER — PROPOFOL 10 MG/ML
INJECTION, EMULSION INTRAVENOUS AS NEEDED
Status: DISCONTINUED | OUTPATIENT
Start: 2021-02-03 | End: 2021-02-03

## 2021-02-03 RX ORDER — LIDOCAINE HYDROCHLORIDE 20 MG/ML
INJECTION, SOLUTION EPIDURAL; INFILTRATION; INTRACAUDAL; PERINEURAL AS NEEDED
Status: DISCONTINUED | OUTPATIENT
Start: 2021-02-03 | End: 2021-02-03

## 2021-02-03 RX ORDER — MIDAZOLAM HYDROCHLORIDE 2 MG/2ML
INJECTION, SOLUTION INTRAMUSCULAR; INTRAVENOUS AS NEEDED
Status: DISCONTINUED | OUTPATIENT
Start: 2021-02-03 | End: 2021-02-03

## 2021-02-03 RX ORDER — PROMETHAZINE HYDROCHLORIDE 25 MG/1
25 SUPPOSITORY RECTAL EVERY 6 HOURS PRN
Qty: 30 SUPPOSITORY | Refills: 2 | Status: SHIPPED | OUTPATIENT
Start: 2021-02-03 | End: 2021-03-17

## 2021-02-03 RX ORDER — SODIUM CHLORIDE 9 MG/ML
125 INJECTION, SOLUTION INTRAVENOUS CONTINUOUS
Status: DISCONTINUED | OUTPATIENT
Start: 2021-02-03 | End: 2021-02-07 | Stop reason: HOSPADM

## 2021-02-03 RX ADMIN — PROPOFOL 200 MG: 10 INJECTION, EMULSION INTRAVENOUS at 09:43

## 2021-02-03 RX ADMIN — PROPOFOL 30 MG: 10 INJECTION, EMULSION INTRAVENOUS at 09:48

## 2021-02-03 RX ADMIN — LIDOCAINE HYDROCHLORIDE 2 ML: 20 INJECTION, SOLUTION EPIDURAL; INFILTRATION; INTRACAUDAL; PERINEURAL at 09:40

## 2021-02-03 RX ADMIN — SODIUM CHLORIDE 125 ML/HR: 0.9 INJECTION, SOLUTION INTRAVENOUS at 08:55

## 2021-02-03 RX ADMIN — MIDAZOLAM 2 MG: 1 INJECTION INTRAMUSCULAR; INTRAVENOUS at 09:38

## 2021-02-03 NOTE — ANESTHESIA PREPROCEDURE EVALUATION
Procedure:  EGD    Relevant Problems   ANESTHESIA (within normal limits)      CARDIO (within normal limits)      GI/HEPATIC  frequent vomiting, none today      MUSCULOSKELETAL   (+) Intermittent low back pain      NEURO/PSYCH   (+) Depression      PULMONARY (within normal limits)      Other   (+) Marijuana use, continuous        Physical Exam    Airway    Mallampati score: I  TM Distance: >3 FB  Neck ROM: full     Dental       Cardiovascular  Cardiovascular exam normal    Pulmonary  Pulmonary exam normal     Other Findings        Anesthesia Plan  ASA Score- 2     Anesthesia Type- IV sedation with anesthesia with ASA Monitors  Additional Monitors:   Airway Plan:     Comment: Very anxious  Plan Factors-    Chart reviewed  Existing labs reviewed  Induction- intravenous  Postoperative Plan-     Informed Consent- Anesthetic plan and risks discussed with patient

## 2021-02-03 NOTE — ANESTHESIA POSTPROCEDURE EVALUATION
Post-Op Assessment Note    CV Status:  Stable    Pain management: adequate     Mental Status:  Alert and awake   Hydration Status:  Euvolemic   PONV Controlled:  Controlled   Airway Patency:  Patent      Post Op Vitals Reviewed: Yes      Staff: Anesthesiologist         No complications documented      BP      Temp     Pulse    Resp      SpO2

## 2021-02-03 NOTE — DISCHARGE INSTRUCTIONS
Upper Endoscopy   WHAT YOU NEED TO KNOW:   An upper endoscopy is also called an upper gastrointestinal (GI) endoscopy, or an esophagogastroduodenoscopy (EGD)  You may feel bloated, gassy, or have some abdominal discomfort after your procedure  Your throat may be sore for 24 to 36 hours  You may burp or pass gas from air that is still inside your body  DISCHARGE INSTRUCTIONS:   Call 911 for any of the following:   · You have sudden chest pain or trouble breathing  Seek care immediately if:   · You feel dizzy or faint  · You have trouble swallowing  · Your bowel movements are very dark or black  · Your abdomen is hard and firm and you have severe pain  · You vomit blood  Contact your healthcare provider if:   · You feel full or bloated and cannot burp or pass gas  · You have not had a bowel movement for 3 days after your procedure  · You have neck pain  · You have a fever or chills  · You have nausea or are vomiting  · You have a rash or hives  · You have questions or concerns about your endoscopy  Relieve a sore throat:  Suck on throat lozenges or crushed ice  Gargle with a small amount of warm salt water  Mix 1 teaspoon of salt and 1 cup of warm water to make salt water  Relieve gas and discomfort from bloating:  Lie on your right side with a heating pad on your abdomen  Take short walks to help pass gas  Eat small meals until bloating is relieved  Rest after your procedure: You have been given medicine to relax you  Do not  drive or make important decisions until the day after your procedure  Return to your normal activity as directed  You can usually return to work the day after your procedure  Follow up with your healthcare provider as directed:  Write down your questions so you remember to ask them during your visits     © 2017 [address] is for End User's use only and may not be sold, redistributed or otherwise used for commercial purposes  All illustrations and images included in CareNotes® are the copyrighted property of A D A M [employer]  or Brian Kidd  The above information is an  only  It is not intended as medical advice for individual conditions or treatments  Talk to your doctor, nurse or pharmacist before following any medical regimen to see if it is safe and effective for you

## 2021-02-03 NOTE — INTERVAL H&P NOTE
H&P reviewed  After examining the patient I find no changes in the patients condition since the H&P had been written      Vitals:    02/03/21 0849   BP: 137/95   Pulse: 93   Resp: 21   Temp: (!) 96 9 °F (36 1 °C)   SpO2: 98%

## 2021-02-03 NOTE — TELEPHONE ENCOUNTER
Called pt mother because pt is still in pain and Dr Isa Calvillo advised to get her a sooner appt with PA which has been lucretia during conversation cherie( mother) stated she needs a refill on this  Promethegan 25 MG suppository and      and would like to speak with someone about the pain so please call Julian Delgadillo at [phone]

## 2021-02-04 DIAGNOSIS — R07.9 CHEST PAIN, UNSPECIFIED TYPE: ICD-10-CM

## 2021-02-04 DIAGNOSIS — R10.13 EPIGASTRIC PAIN: ICD-10-CM

## 2021-02-04 DIAGNOSIS — R10.13 EPIGASTRIC PAIN: Primary | ICD-10-CM

## 2021-02-04 DIAGNOSIS — R11.2 INTRACTABLE VOMITING WITH NAUSEA: ICD-10-CM

## 2021-02-04 RX ORDER — TRAMADOL HYDROCHLORIDE 50 MG/1
50 TABLET ORAL EVERY 12 HOURS PRN
Qty: 15 TABLET | Refills: 1 | Status: SHIPPED | OUTPATIENT
Start: 2021-02-04 | End: 2021-02-04 | Stop reason: SDUPTHER

## 2021-02-04 RX ORDER — TRAMADOL HYDROCHLORIDE 50 MG/1
50 TABLET ORAL EVERY 12 HOURS PRN
Qty: 30 TABLET | Refills: 1 | Status: SHIPPED | OUTPATIENT
Start: 2021-02-04 | End: 2021-02-17 | Stop reason: SDUPTHER

## 2021-02-04 NOTE — TELEPHONE ENCOUNTER
Pt's mother called again requesting to speak to someone regarding pt's after procedure pain   Please call [phone]

## 2021-02-04 NOTE — TELEPHONE ENCOUNTER
See 1/29/21 encounter    Patient's mother called for refill on promenthagan and tramadol for on going symptoms  Patient had EGD done yesterday -biopsy in process  States symptoms are the same as prior to the procedure  [facility] script was sent to pharmacy yesterday  Requesting refill on Tramadol until she is seen at follow up appointment on 2/17/21  Tramadol - has one tablet left

## 2021-02-04 NOTE — TELEPHONE ENCOUNTER
MARLEN    I spoke to patient's mother over the phone  She continues to have persistent pain which triggers nausea and vomiting  I reviewed EGD results yesterday which showed some bile reflux  Biopsies pending  She is taking Carafate (when she is able to keep this down)  She has been using Phenergan suppositories around the clock as well as Tramadol as needed for mod-severe pain  I explained I would like to obtain CT scan as well as ultrasound to rule out inflammatory process  Her mother will call to schedule both tests this afternoon  In the meantime, I will send refills for her medications  I will follow-up on these results and see her in the office in 2 weeks

## 2021-02-05 ENCOUNTER — TELEPHONE (OUTPATIENT)
Dept: GASTROENTEROLOGY | Facility: AMBULARY SURGERY CENTER | Age: 26
End: 2021-02-05

## 2021-02-05 DIAGNOSIS — R11.2 INTRACTABLE VOMITING WITH NAUSEA: Primary | ICD-10-CM

## 2021-02-05 NOTE — TELEPHONE ENCOUNTER
[address] Gastroenterology Nurse   Cc: Eloy West             CT pelvis is not meeting medical necessity for the diagnosis codes used  Please advise  **Patient is scheduled for ultrasound 2/7 and CT abdomen pelvis 2/11/21  We can submit ultrasound after it is completed to try to get authorization on CT  Any other recommendation- most likely will deny if I attempt peer to peer?

## 2021-02-05 NOTE — TELEPHONE ENCOUNTER
Can we include diagnosis "abnormal weight loss"? I documented 17 lbs abnormal weight loss in 12 days in my office note  If not, just CT abdomen should be OK

## 2021-02-05 NOTE — TELEPHONE ENCOUNTER
See attached messages from [facility] team regarding CT authorization-            Hoda Arana RN             No peer to peer yet  And they require a PA or provider do peer to peer  I saw she has weight loss, but that was not a dx code that was entered on the order  Hoda Arana RN                 I submitted with abnormal weight loss  It still did not approve  I am going to fax in the phone note from today, as well as the EGD report, Office note and the ED note from [facility]  I am off on Monday, but will check status on Tuesday and keep you updated

## 2021-02-07 ENCOUNTER — HOSPITAL ENCOUNTER (OUTPATIENT)
Dept: ULTRASOUND IMAGING | Facility: HOSPITAL | Age: 26
Discharge: HOME | End: 2021-02-07
Payer: COMMERCIAL

## 2021-02-07 DIAGNOSIS — R10.13 EPIGASTRIC PAIN: ICD-10-CM

## 2021-02-07 DIAGNOSIS — R11.2 INTRACTABLE VOMITING WITH NAUSEA, UNSPECIFIED VOMITING TYPE: ICD-10-CM

## 2021-02-07 PROCEDURE — 76705 ECHO EXAM OF ABDOMEN: CPT

## 2021-02-08 NOTE — TELEPHONE ENCOUNTER
Will await for update tomorrow, if pelvis not covered will switch to CT scan of the abdomen only  Can also consider GES

## 2021-02-09 NOTE — TELEPHONE ENCOUNTER
Hermelinda Collins             CT pelvis was not approved  Only CT abdomen   If PA would like to do peer to peer the number is [phone] and ID number : [id number]

## 2021-02-11 ENCOUNTER — TELEPHONE (OUTPATIENT)
Dept: GASTROENTEROLOGY | Facility: AMBULARY SURGERY CENTER | Age: 26
End: 2021-02-11

## 2021-02-11 ENCOUNTER — HOSPITAL ENCOUNTER (OUTPATIENT)
Dept: CT IMAGING | Facility: HOSPITAL | Age: 26
Discharge: HOME | End: 2021-02-11
Payer: COMMERCIAL

## 2021-02-11 DIAGNOSIS — R11.2 INTRACTABLE VOMITING WITH NAUSEA: ICD-10-CM

## 2021-02-11 PROCEDURE — G1004 CDSM NDSC: HCPCS

## 2021-02-11 PROCEDURE — 74160 CT ABDOMEN W/CONTRAST: CPT

## 2021-02-11 RX ADMIN — IOHEXOL 100 ML: 350 INJECTION, SOLUTION INTRAVENOUS at 18:49

## 2021-02-11 NOTE — TELEPHONE ENCOUNTER
Patient's mother is aware provider in agreement with recommendations  She will complete pending blood work and CT scan  She also wanted to make provider aware of family history of COPELAND  She will make a list of concerns and discuss further at follow up visit

## 2021-02-11 NOTE — TELEPHONE ENCOUNTER
Patients GI provider:  Dr Lyle Washington    Number to return call: (  [phone] mom cherie    Reason for call: Pt still vomiting, no appetite, losing weight---mom would like advice    Scheduled procedure/appointment date if applicable: Apt/procedure 4-30-21

## 2021-02-11 NOTE — TELEPHONE ENCOUNTER
See 1/29/21 encounter      Spoke to patient's mother Echo Do  She called with concern of on going symptoms  28 lb weight loss over 3 week period, 10/10 abdominal pain, n/v, 2 soft BMs in 4 weeks  States the abdominal pain is what is causing the vomiting  US, EGD done and CT scheduled tonight/seen in ED 3 times in January  Afebrile  Taking all medications as prescribed when she can tolerate/ no vomiting  Tolerating po fluids/ increase for hydration  She is intermittently able to tolerate full liquids  Zofran ineffective/ bentyl -tachycardia  She is a chronic marijuana smoker but has not had any x 3 weeks  Mother reports her symptoms started after drinking a few mixed drinks and beers  Discussed cyclic vomiting cycle and cannabinoid hyperemesis syndrome with her and that it may take months for body to reset if related to this  She does not feel that it is related and is requesting further blood testing  I made her aware there are pending labs in her chart  Instructed her to continue with current medication regimen and go to ED if unable maintain hydration  Follow up visit 2/17/21 - discuss further/ gastric emptying study  Additional blood work is recommended? Patient's mother tearful and expressed frustration/ concern  Any other recommendations?

## 2021-02-11 NOTE — TELEPHONE ENCOUNTER
I agree with your recommendations as below  No other testing for now aside for CT tonight, if normal can consider GES

## 2021-02-16 DIAGNOSIS — R07.9 CHEST PAIN, UNSPECIFIED TYPE: ICD-10-CM

## 2021-02-16 DIAGNOSIS — R11.2 INTRACTABLE VOMITING WITH NAUSEA: Primary | ICD-10-CM

## 2021-02-16 DIAGNOSIS — R10.13 EPIGASTRIC PAIN: ICD-10-CM

## 2021-02-16 DIAGNOSIS — R11.2 INTRACTABLE VOMITING WITH NAUSEA, UNSPECIFIED VOMITING TYPE: ICD-10-CM

## 2021-02-16 NOTE — PATIENT INSTRUCTIONS
EGD scheduled on 2/3/21 with Dr Caro at [facility]   Ct-scan schedule on 2/11/21 at [facility]

## 2021-02-16 NOTE — TELEPHONE ENCOUNTER
Called and spoke with patient  Patient reports she has not had any nausea or vomiting for a few days now, but would like to continue taking omeprazole  Please send refill

## 2021-02-16 NOTE — TELEPHONE ENCOUNTER
Please call patient  I received a refill request through her pharmacy for the omeprazole that I prescribed a few weeks ago when she had her stomach pain, nausea and vomiting  Please follow-up with patient and see how she feels  Please see if she would like refills of the omeprazole and I can send that into the pharmacy for her

## 2021-02-17 ENCOUNTER — OFFICE VISIT (OUTPATIENT)
Dept: GASTROENTEROLOGY | Facility: CLINIC | Age: 26
End: 2021-02-17
Payer: COMMERCIAL

## 2021-02-17 ENCOUNTER — TELEPHONE (OUTPATIENT)
Dept: GASTROENTEROLOGY | Facility: CLINIC | Age: 26
End: 2021-02-17

## 2021-02-17 VITALS
TEMPERATURE: 97.6 F | HEIGHT: 66 IN | DIASTOLIC BLOOD PRESSURE: 70 MMHG | BODY MASS INDEX: 22.35 KG/M2 | HEART RATE: 123 BPM | WEIGHT: 139.1 LBS | SYSTOLIC BLOOD PRESSURE: 118 MMHG

## 2021-02-17 DIAGNOSIS — Z83.49 FAMILY HISTORY OF ALPHA 1 ANTITRYPSIN DEFICIENCY: ICD-10-CM

## 2021-02-17 DIAGNOSIS — R10.13 EPIGASTRIC PAIN: Primary | ICD-10-CM

## 2021-02-17 DIAGNOSIS — R10.13 EPIGASTRIC PAIN: ICD-10-CM

## 2021-02-17 DIAGNOSIS — R11.2 INTRACTABLE VOMITING WITH NAUSEA, UNSPECIFIED VOMITING TYPE: ICD-10-CM

## 2021-02-17 DIAGNOSIS — R07.9 CHEST PAIN, UNSPECIFIED TYPE: ICD-10-CM

## 2021-02-17 DIAGNOSIS — R93.5 ABNORMAL ABDOMINAL CT SCAN: ICD-10-CM

## 2021-02-17 PROCEDURE — 99214 OFFICE O/P EST MOD 30 MIN: CPT | Performed by: PHYSICIAN ASSISTANT

## 2021-02-17 RX ORDER — SUCRALFATE 1 G/1
1 TABLET ORAL 4 TIMES DAILY
Qty: 56 TABLET | Refills: 2 | Status: SHIPPED | OUTPATIENT
Start: 2021-02-17 | End: 2021-02-17 | Stop reason: SDUPTHER

## 2021-02-17 RX ORDER — SUCRALFATE 1 G/1
1 TABLET ORAL 3 TIMES DAILY
Qty: 90 TABLET | Refills: 3 | Status: SHIPPED | OUTPATIENT
Start: 2021-02-17 | End: 2021-02-17

## 2021-02-17 RX ORDER — OMEPRAZOLE 40 MG/1
CAPSULE, DELAYED RELEASE ORAL
Qty: 30 CAPSULE | Refills: 3 | Status: SHIPPED | OUTPATIENT
Start: 2021-02-17 | End: 2021-06-04

## 2021-02-17 RX ORDER — TRAMADOL HYDROCHLORIDE 50 MG/1
50 TABLET ORAL EVERY 12 HOURS PRN
Qty: 30 TABLET | Refills: 0 | Status: SHIPPED | OUTPATIENT
Start: 2021-02-17 | End: 2021-02-26

## 2021-02-17 RX ORDER — SUCRALFATE 1 G/1
1 TABLET ORAL 3 TIMES DAILY
Qty: 90 TABLET | Refills: 3
Start: 2021-02-17 | End: 2021-03-17

## 2021-02-17 NOTE — LETTER
February 17, 2021     [address]  [address] [place] [address]    Patient: Adam Trivedi   YOB: 1995   Date of Visit: 2/17/2021       Dear Dr Ketty Smith: Thank you for referring Adam Trivedi to me for evaluation  Below are my notes for this consultation  If you have questions, please do not hesitate to call me  I look forward to following your patient along with you  Sincerely,        Alexei Hussein PA-C        CC: No Recipients  Alexei Hussein PA-C  2/17/2021  5:45 PM  Sign when Signing Visit  [facility] - Outpatient Follow-up Note  Adam Trivedi 23 y o  female MRN: [id number]  Encounter: [id number]          ASSESSMENT AND PLAN:      1  Epigastric pain  2  Intractable vomiting with nausea, unspecified vomiting type  The cause of her intractable epigastric pain, nausea, and vomiting is unclear -  may be related to bile reflux, biliary dyskinesia, gastroparesis, possible SMA syndrome  I recommend gastric emptying study and HIDA with CCK  She needs to stop tramadol at least 48 hours before these tests  I also recommend referral to vascular surgery due to abnormalities seen on CT scan  She will continue Prilosec 40 mg daily, Carafate 3 times daily, Zofran 4 mg every 8 hours PRN, promethazine suppositories PRN  She will continue full liquid diet since solid food brings on the symptoms     - NM gastric emptying; Future  - NM hepatobiliary w rx; Future  - Ambulatory referral to Vascular Surgery; Future  - sucralfate (CARAFATE) 1 g tablet; Take 1 tablet (1 g total) by mouth 3 (three) times a day  Dispense: 90 tablet; Refill: 3    3  Abnormal abdominal CT scan   CT abdomen with IV contrast from 02/11/2021 shows marked transition in caliber of the duodenum as it crosses the midline deep to SMA which could be compatible with SMA syndrome   There is also pronounced narrowing of the left renal vein as it passes between the SMA and aorta  Given these findings, I recommend referral to vascular surgery for further evaluation      - Ambulatory referral to Vascular Surgery; Future    4  Family history of alpha 1 antitrypsin deficiency  Her mother reports extensive family history of alpha-1 antitrypsin deficiency, and her mother is concerned about the focal fatty infiltration of the liver noted on ultrasound  I gave her reassurance and explained the fat is very focal and not diffuse  Also, her liver enzymes are normal  However, given her family history, it is reasonable to check for deficiency  - Alpha-1-antitrypsin; Future    Follow-up in 1 month with physician  ______________________________________________________________________    SUBJECTIVE:  26-year-old female presenting to the office with her mother for follow-up of nausea, vomiting, chest pain, abdominal pain  She began having acute symptoms about 1 month ago as detailed in my previous office note  She underwent EGD which showed bile reflux  Right upper quadrant ultrasound was unremarkable except for sludge in the gallbladder and focal area of fat around the gallbladder fossa and falciform ligament  She then had CT scan of the abdomen with IV contrast which showed concern for possible SMA syndrome and pronounced narrowing of the left renal vein as it passes between the SMA and aorta  She continues to have intermittent nausea, vomiting, abdominal pain  She felt OK Sun-Tues but began vomiting yesterday afternoon until early this morning  She gets severe epigastric pain which radiates into her chest and causes her to have intractable nausea and vomiting  She is able to tolerate full liquid diet, but once she begins incorporating solid food, she has worsening symptoms  She has gained a few pounds back, but overall has lost over 20 pounds in the past month  She did confirm that the weight loss began AFTER the pain and vomiting  She is having soft bowel movements   She denies diarrhea and constipation  REVIEW OF SYSTEMS IS OTHERWISE NEGATIVE  Historical Information   History reviewed  No pertinent past medical history  Past Surgical History:   Procedure Laterality Date    TOOTH EXTRACTION      UPPER GASTROINTESTINAL ENDOSCOPY       Social History   Social History     Substance and Sexual Activity   Alcohol Use No     Social History     Substance and Sexual Activity   Drug Use Yes    Types: Marijuana     Social History     Tobacco Use   Smoking Status Former Smoker    Types: Cigarettes   Smokeless Tobacco Never Used     Family History   Problem Relation Age of Onset    Hypothyroidism Mother     Kidney cancer Maternal Grandmother     Lymphoma Maternal Grandfather     Breast cancer Paternal Grandmother     Lung cancer Paternal Grandfather     Alpha-1 antitrypsin deficiency Family        Meds/Allergies       Current Outpatient Medications:     cyclobenzaprine (FLEXERIL) 10 mg tablet    dicyclomine (BENTYL) 10 mg capsule    ISIBLOOM 0 15-30 MG-MCG per tablet    omeprazole (PriLOSEC) 40 MG capsule    ondansetron (ZOFRAN-ODT) 4 mg disintegrating tablet    promethazine (Promethegan) 25 mg suppository    traMADol (ULTRAM) 50 mg tablet    sucralfate (CARAFATE) 1 g tablet    valACYclovir (VALTREX) 1,000 mg tablet    Allergies   Allergen Reactions    Pollen Extract            Objective     Blood pressure 118/70, pulse (!) 123, temperature 97 6 °F (36 4 °C), temperature source Tympanic, height 5' 6" (1 676 m), weight 63 1 kg (139 lb 1 6 oz)  Body mass index is 22 45 kg/m²  PHYSICAL EXAM:      General Appearance:   Alert, cooperative, no distress   HEENT:   Normocephalic, atraumatic, anicteric      Neck:  Supple, symmetrical, trachea midline   Lungs:   Clear to auscultation bilaterally; no rales, rhonchi or wheezing; respirations unlabored    Heart[de-identified]   Regular rate and rhythm; no murmur, rub, or gallop     Abdomen:   Soft, non-tender, non-distended; normal bowel sounds; no masses, no organomegaly    Genitalia:   Deferred    Rectal:   Deferred    Extremities:  No cyanosis, clubbing or edema    Pulses:  2+ and symmetric    Skin:  No jaundice, rashes, or lesions    Lymph nodes:  No palpable cervical lymphadenopathy        Lab Results:   No visits with results within 1 Day(s) from this visit  Latest known visit with results is:   Hospital Outpatient Visit on 02/03/2021   Component Date Value    EXT Preg Test, Ur 02/03/2021 Negative     Control 02/03/2021 Valid     Case Report 02/03/2021                      Value:Surgical Pathology Report                         Case: [id number]                                   Authorizing Provider:  Dottie Reyes MD           Collected:           02/03/2021 0944              Ordering Location:     [facility]        Received:            02/03/2021 2125                                     [address] Endoscopy                                                     Pathologist:           Rm Cosby MD                                                                Specimens:   A) - Duodenum, bx, R/O celiac                                                                       B) - Stomach, gastric bx, R/O H  pylori                                                    Final Diagnosis 02/03/2021                      Value: This result contains rich text formatting which cannot be displayed here   Additional Information 02/03/2021                      Value: This result contains rich text formatting which cannot be displayed here  Yari Neves Gross Description 02/03/2021                      Value: This result contains rich text formatting which cannot be displayed here  Radiology Results:   Ct Abdomen W Contrast    Result Date: 2/16/2021  Narrative: CT ABDOMEN WITH IV CONTRAST INDICATION:   R11 2: Nausea with vomiting, unspecified  COMPARISON: None  TECHNIQUE:  CT examination of the abdomen was performed   Axial, sagittal, and coronal 2D reformatted images were created from the source data and submitted for interpretation  Radiation dose length product (DLP) for this visit:  360 mGy-cm   This examination, like all CT scans performed in the [facility], was performed utilizing techniques to minimize radiation dose exposure, including the use of iterative reconstruction and automated exposure control  IV Contrast:  100 mL of iohexol (OMNIPAQUE) Enteric Contrast:  Enteric contrast was administered  FINDINGS: ABDOMEN LOWER CHEST:  No clinically significant abnormality identified in the visualized lower chest  LIVER/BILIARY TREE:  Unremarkable  GALLBLADDER:  No calcified gallstones  No pericholecystic inflammatory change  SPLEEN:  Unremarkable  PANCREAS:  Unremarkable  ADRENAL GLANDS:  Unremarkable  KIDNEYS/URETERS:  Unremarkable  No hydronephrosis  VISUALIZED STOMACH AND BOWEL:  The stomach and duodenum are somewhat distended with enteric contrast   As the duodenum passes across the midline, deep to the superior mesenteric artery, it becomes extremely narrow in caliber  On the sagittal reformatted  images, the angle between the aorta and the superior mesenteric artery is extremely acute  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  There is also quite severe narrowing of the left renal vein as it passes deep to the SMA  The angle between the 2 vessels is only 10 degrees on image 85 series 602  The remainder of the bowel seems to be within normal limits in the field-of-view  ABDOMINAL CAVITY:  No ascites or free intraperitoneal air  No lymphadenopathy  VESSELS:  See above  ABDOMINAL WALL:  No suspicious findings  OSSEOUS STRUCTURES:  No acute fracture or destructive osseous lesion  Impression: Marked transition in caliber of the duodenum as it crosses the midline deep to the superior mesenteric artery which could be compatible with SMA syndrome in this patient with nausea and vomiting and weight loss   There is also quite pronounced narrowing of the left renal vein as it passes between the superior mesenteric artery and aorta  There is only 10 degrees angulation between the aorta and the superior mesenteric artery  Workstation performed: [id number]     Us Right Upper Quadrant    Result Date: 2/7/2021  Narrative: RIGHT UPPER QUADRANT ULTRASOUND INDICATION:     Nausea, vomiting, and epigastric pain for 3 weeks  COMPARISON:  None TECHNIQUE:   Real-time ultrasound of the right upper quadrant was performed with a curvilinear transducer with both volumetric sweeps and still imaging techniques  FINDINGS: PANCREAS:  Visualized portions of the pancreas are within normal limits  AORTA AND IVC:  Visualized portions are normal for patient age  LIVER: Size:  Within normal range  Contour:  Surface contour is smooth  Parenchyma:  Hazy areas of increased echogenicity in the liver appear geographic and associated with the gallbladder fossa and falciform ligament  Distribution consistent with areas of focal fatty infiltration  Otherwise, normal parenchymal echotexture without concerning mass  Vasculature: Portal venous flow is antegrade with normal undulating waveform  BILIARY: The gallbladder is normal in caliber  No wall thickening or pericholecystic fluid  Minimal sludge is seen within the patient was rolled, but no shadowing stones are present  Technologist reports the patient has epigastric tenderness with scanning, but not sonographic Zuniga sign  No intrahepatic biliary dilatation  CBD measures 3 mm  No choledocholithiasis  KIDNEY: Right kidney measures 10 3 cm x 4 8 cm x 6 1 cm  Within normal limits  ASCITES:   None  Impression: 1  No acute findings  2   Minimal sludge in the gallbladder without cholecystitis  3   Incidental note of mild focal fatty infiltration around the gallbladder fossa and falciform ligament   Workstation performed: [id number]

## 2021-02-17 NOTE — PROGRESS NOTES
[facility] - Outpatient Follow-up Note  Saroj Acosta 23 y o  female MRN: [id number]  Encounter: [id number]          ASSESSMENT AND PLAN:      1  Epigastric pain  2  Intractable vomiting with nausea, unspecified vomiting type  The cause of her intractable epigastric pain, nausea, and vomiting is unclear -  may be related to bile reflux, biliary dyskinesia, gastroparesis, possible SMA syndrome  I recommend gastric emptying study and HIDA with CCK  She needs to stop tramadol at least 48 hours before these tests  I also recommend referral to vascular surgery due to abnormalities seen on CT scan  She will continue Prilosec 40 mg daily, Carafate 3 times daily, Zofran 4 mg every 8 hours PRN, promethazine suppositories PRN  She will continue full liquid diet since solid food brings on the symptoms     - NM gastric emptying; Future  - NM hepatobiliary w rx; Future  - Ambulatory referral to Vascular Surgery; Future  - sucralfate (CARAFATE) 1 g tablet; Take 1 tablet (1 g total) by mouth 3 (three) times a day  Dispense: 90 tablet; Refill: 3    3  Abnormal abdominal CT scan   CT abdomen with IV contrast from 02/11/2021 shows marked transition in caliber of the duodenum as it crosses the midline deep to SMA which could be compatible with SMA syndrome  There is also pronounced narrowing of the left renal vein as it passes between the SMA and aorta  Given these findings, I recommend referral to vascular surgery for further evaluation      - Ambulatory referral to Vascular Surgery; Future    4  Family history of alpha 1 antitrypsin deficiency  Her mother reports extensive family history of alpha-1 antitrypsin deficiency, and her mother is concerned about the focal fatty infiltration of the liver noted on ultrasound  I gave her reassurance and explained the fat is very focal and not diffuse   Also, her liver enzymes are normal  However, given her family history, it is reasonable to check for deficiency  - Alpha-1-antitrypsin; Future    Follow-up in 1 month with physician  ______________________________________________________________________    SUBJECTIVE:  46-year-old female presenting to the office with her mother for follow-up of nausea, vomiting, chest pain, abdominal pain  She began having acute symptoms about 1 month ago as detailed in my previous office note  She underwent EGD which showed bile reflux  Right upper quadrant ultrasound was unremarkable except for sludge in the gallbladder and focal area of fat around the gallbladder fossa and falciform ligament  She then had CT scan of the abdomen with IV contrast which showed concern for possible SMA syndrome and pronounced narrowing of the left renal vein as it passes between the SMA and aorta  She continues to have intermittent nausea, vomiting, abdominal pain  She felt OK Sun-Tues but began vomiting yesterday afternoon until early this morning  She gets severe epigastric pain which radiates into her chest and causes her to have intractable nausea and vomiting  She is able to tolerate full liquid diet, but once she begins incorporating solid food, she has worsening symptoms  She has gained a few pounds back, but overall has lost over 20 pounds in the past month  She did confirm that the weight loss began AFTER the pain and vomiting  She is having soft bowel movements  She denies diarrhea and constipation  REVIEW OF SYSTEMS IS OTHERWISE NEGATIVE  Historical Information   History reviewed  No pertinent past medical history    Past Surgical History:   Procedure Laterality Date    TOOTH EXTRACTION      UPPER GASTROINTESTINAL ENDOSCOPY       Social History   Social History     Substance and Sexual Activity   Alcohol Use No     Social History     Substance and Sexual Activity   Drug Use Yes    Types: Marijuana     Social History     Tobacco Use   Smoking Status Former Smoker    Types: Cigarettes   Smokeless Tobacco Never Used Family History   Problem Relation Age of Onset    Hypothyroidism Mother     Kidney cancer Maternal Grandmother     Lymphoma Maternal Grandfather     Breast cancer Paternal Grandmother     Lung cancer Paternal Grandfather     Alpha-1 antitrypsin deficiency Family        Meds/Allergies       Current Outpatient Medications:     cyclobenzaprine (FLEXERIL) 10 mg tablet    dicyclomine (BENTYL) 10 mg capsule    ISIBLOOM 0 15-30 MG-MCG per tablet    omeprazole (PriLOSEC) 40 MG capsule    ondansetron (ZOFRAN-ODT) 4 mg disintegrating tablet    promethazine (Promethegan) 25 mg suppository    traMADol (ULTRAM) 50 mg tablet    sucralfate (CARAFATE) 1 g tablet    valACYclovir (VALTREX) 1,000 mg tablet    Allergies   Allergen Reactions    Pollen Extract            Objective     Blood pressure 118/70, pulse (!) 123, temperature 97 6 °F (36 4 °C), temperature source Tympanic, height 5' 6" (1 676 m), weight 63 1 kg (139 lb 1 6 oz)  Body mass index is 22 45 kg/m²  PHYSICAL EXAM:      General Appearance:   Alert, cooperative, no distress   HEENT:   Normocephalic, atraumatic, anicteric      Neck:  Supple, symmetrical, trachea midline   Lungs:   Clear to auscultation bilaterally; no rales, rhonchi or wheezing; respirations unlabored    Heart[de-identified]   Regular rate and rhythm; no murmur, rub, or gallop  Abdomen:   Soft, non-tender, non-distended; normal bowel sounds; no masses, no organomegaly    Genitalia:   Deferred    Rectal:   Deferred    Extremities:  No cyanosis, clubbing or edema    Pulses:  2+ and symmetric    Skin:  No jaundice, rashes, or lesions    Lymph nodes:  No palpable cervical lymphadenopathy        Lab Results:   No visits with results within 1 Day(s) from this visit     Latest known visit with results is:   Hospital Outpatient Visit on 02/03/2021   Component Date Value    EXT Preg Test, Ur 02/03/2021 Negative     Control 02/03/2021 Valid     Case Report 02/03/2021 Value:Surgical Pathology Report                         Case: [id number]                                   Authorizing Provider:  Bhavana Arboleda MD           Collected:           02/03/2021 0944              Ordering Location:     [facility]        Received:            02/03/2021 [address] [place] [address] [facility]                                                     Pathologist:           Ana Suggs MD                                                                Specimens:   A) - Duodenum, bx, R/O celiac                                                                       B) - Stomach, gastric bx, R/O H  pylori                                                    Final Diagnosis 02/03/2021                      Value: This result contains rich text formatting which cannot be displayed here   Additional Information 02/03/2021                      Value: This result contains rich text formatting which cannot be displayed here  [facility] Gross Description 02/03/2021                      Value: This result contains rich text formatting which cannot be displayed here  Radiology Results:   Ct Abdomen W Contrast    Result Date: 2/16/2021  Narrative: CT ABDOMEN WITH IV CONTRAST INDICATION:   R11 2: Nausea with vomiting, unspecified  COMPARISON: None  TECHNIQUE:  CT examination of the abdomen was performed  Axial, sagittal, and coronal 2D reformatted images were created from the source data and submitted for interpretation  Radiation dose length product (DLP) for this visit:  360 mGy-cm   This examination, like all CT scans performed in the [facility], was performed utilizing techniques to minimize radiation dose exposure, including the use of iterative reconstruction and automated exposure control  IV Contrast:  100 mL of iohexol (OMNIPAQUE) Enteric Contrast:  Enteric contrast was administered   FINDINGS: ABDOMEN LOWER CHEST:  No clinically significant abnormality identified in the visualized lower chest  LIVER/BILIARY TREE:  Unremarkable  GALLBLADDER:  No calcified gallstones  No pericholecystic inflammatory change  SPLEEN:  Unremarkable  PANCREAS:  Unremarkable  ADRENAL GLANDS:  Unremarkable  KIDNEYS/URETERS:  Unremarkable  No hydronephrosis  VISUALIZED STOMACH AND BOWEL:  The stomach and duodenum are somewhat distended with enteric contrast   As the duodenum passes across the midline, deep to the superior mesenteric artery, it becomes extremely narrow in caliber  On the sagittal reformatted  images, the angle between the aorta and the superior mesenteric artery is extremely acute  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  There is also quite severe narrowing of the left renal vein as it passes deep to the SMA  The angle between the 2 vessels is only 10 degrees on image 85 series 602  The remainder of the bowel seems to be within normal limits in the field-of-view  ABDOMINAL CAVITY:  No ascites or free intraperitoneal air  No lymphadenopathy  VESSELS:  See above  ABDOMINAL WALL:  No suspicious findings  OSSEOUS STRUCTURES:  No acute fracture or destructive osseous lesion  Impression: Marked transition in caliber of the duodenum as it crosses the midline deep to the superior mesenteric artery which could be compatible with SMA syndrome in this patient with nausea and vomiting and weight loss  There is also quite pronounced narrowing of the left renal vein as it passes between the superior mesenteric artery and aorta  There is only 10 degrees angulation between the aorta and the superior mesenteric artery  Workstation performed: [id number]     Us Right Upper Quadrant    Result Date: 2/7/2021  Narrative: RIGHT UPPER QUADRANT ULTRASOUND INDICATION:     Nausea, vomiting, and epigastric pain for 3 weeks  COMPARISON:  None TECHNIQUE:   Real-time ultrasound of the right upper quadrant was performed with a curvilinear transducer with both volumetric sweeps and still imaging techniques  FINDINGS: PANCREAS:  Visualized portions of the pancreas are within normal limits  AORTA AND IVC:  Visualized portions are normal for patient age  LIVER: Size:  Within normal range  Contour:  Surface contour is smooth  Parenchyma:  Hazy areas of increased echogenicity in the liver appear geographic and associated with the gallbladder fossa and falciform ligament  Distribution consistent with areas of focal fatty infiltration  Otherwise, normal parenchymal echotexture without concerning mass  Vasculature: Portal venous flow is antegrade with normal undulating waveform  BILIARY: The gallbladder is normal in caliber  No wall thickening or pericholecystic fluid  Minimal sludge is seen within the patient was rolled, but no shadowing stones are present  Technologist reports the patient has epigastric tenderness with scanning, but not sonographic Zuniga sign  No intrahepatic biliary dilatation  CBD measures 3 mm  No choledocholithiasis  KIDNEY: Right kidney measures 10 3 cm x 4 8 cm x 6 1 cm  Within normal limits  ASCITES:   None  Impression: 1  No acute findings  2   Minimal sludge in the gallbladder without cholecystitis  3   Incidental note of mild focal fatty infiltration around the gallbladder fossa and falciform ligament   Workstation performed: [id number]

## 2021-02-18 ENCOUNTER — TELEPHONE (OUTPATIENT)
Dept: GASTROENTEROLOGY | Facility: CLINIC | Age: 26
End: 2021-02-18

## 2021-02-18 NOTE — TELEPHONE ENCOUNTER
----- Message from Walter Neumann PA-C sent at 2/17/2021  5:45 PM EST -----  Please call patient and tell her to stop Tramadol for 48 hours before her HIDA scan and gastric emptying scan as Tramadol can effect those results  She can use Tylenol as needed instead

## 2021-02-19 ENCOUNTER — TELEPHONE (OUTPATIENT)
Dept: INTERNAL MEDICINE CLINIC | Facility: CLINIC | Age: 26
End: 2021-02-19

## 2021-02-19 ENCOUNTER — TRANSCRIBE ORDERS (OUTPATIENT)
Dept: INTERNAL MEDICINE CLINIC | Facility: CLINIC | Age: 26
End: 2021-02-19

## 2021-02-19 DIAGNOSIS — R93.5 ABNORMAL FINDINGS ON DIAGNOSTIC IMAGING OF OTHER ABDOMINAL REGIONS, INCLUDING RETROPERITONEUM: ICD-10-CM

## 2021-02-19 DIAGNOSIS — R11.2 NAUSEA WITH VOMITING, UNSPECIFIED: ICD-10-CM

## 2021-02-19 DIAGNOSIS — R10.13 EPIGASTRIC PAIN: Primary | ICD-10-CM

## 2021-02-19 NOTE — TELEPHONE ENCOUNTER
Can you please call patient back and clarify as to why she is requesting this test to be placed  Is she having any problems that she is concerned about

## 2021-02-19 NOTE — TELEPHONE ENCOUNTER
Patient's mom called requesting a Thyroid Anti Bodies order to be placed for patient   Please review

## 2021-02-22 ENCOUNTER — HOSPITAL ENCOUNTER (OUTPATIENT)
Dept: NUCLEAR MEDICINE | Facility: HOSPITAL | Age: 26
Discharge: HOME | End: 2021-02-22
Payer: COMMERCIAL

## 2021-02-22 DIAGNOSIS — R10.13 EPIGASTRIC PAIN: ICD-10-CM

## 2021-02-22 DIAGNOSIS — R11.2 INTRACTABLE VOMITING WITH NAUSEA, UNSPECIFIED VOMITING TYPE: ICD-10-CM

## 2021-02-22 PROCEDURE — A9537 TC99M MEBROFENIN: HCPCS

## 2021-02-22 PROCEDURE — 78227 HEPATOBIL SYST IMAGE W/DRUG: CPT

## 2021-02-22 PROCEDURE — G1004 CDSM NDSC: HCPCS

## 2021-02-22 RX ADMIN — SINCALIDE 1.3 MCG: 5 INJECTION, POWDER, LYOPHILIZED, FOR SOLUTION INTRAVENOUS at 13:37

## 2021-02-22 NOTE — TELEPHONE ENCOUNTER
See below, please advise  Last time I spoke with a patient on 2/16/21 she was feeling better and denied nausea and vomiting for a few days  Yani Matamoros

## 2021-02-22 NOTE — TELEPHONE ENCOUNTER
Spoke to patient's mother she stated patient has been sick & has been throwing up for 6 weeks  Just wants to make sure she doesn't have thyroid issues

## 2021-02-22 NOTE — TELEPHONE ENCOUNTER
I tried calling pt to get clarification regarding request for testing  I did order fasting BW in June 2020 including TSH and was never done  Last TSH 2018 and was WNL  Asked pt or mom to call back specifically why thyroid abs, was this recommended by GI? Explained what thyroid abs would tell us vs tsh and free T4 just so they understood  Asked them to give us a call back with more info  Are they ok with TSH and free T4 begin ordered? So they still want thyroid abs?     Please let me know what they say when they call back

## 2021-02-22 NOTE — TELEPHONE ENCOUNTER
Pt's mother Alma Davila calling requesting to speak with someone  Alma Davila is concerned that her daughter cannot wait a month to be tested   [phone]

## 2021-02-23 NOTE — TELEPHONE ENCOUNTER
Patient of Mia Dorsey, last seen 2/17/21    History of epigastric pain, intractable vomiting with nausea, abnormal abdominal CT, family history of alpha 1 antitrypsin deficiency, weight loss    Called patient's mother Concepcion Trent and spoke with her  She states that patient is continuing to have epigastric pain, intractable nausea and vomiting, weight loss  Patient had HIDA scan done yesterday and it was negative  She has upcoming GES on 3/9 and appt with vascular surgery on 3/17  Referred to vascular surgery for possible SMA syndrome  Patient's mother is very concerned because patient has lost over 20 lbs, is living in constant pain, and is constantly nauseous or vomiting  She states that the medications do help her, but very minimally  She states patient has only been able to tolerate liquids, no solids for over a week  She reports patient is still urinating regularly and her urine is not dark so she does not believe patient is dehydrated  Patient's mother is requesting given severity of symptoms, we do something because patient "cannot live like this another month"  Given her symptoms, I agreed and let her mother know I would contact vascular surgery and central scheduling to see if we can move up appointments  Concepcion Trent requests I call her back at [phone]  Called [phone] where patient is scheduled to see vascular surgery  I asked for sooner appointment, but unfortunately vascular surgery only comes to that clinic once per month, so the soonest they will be there is 3/17  She states that patient should be able to be seen at [facility] [place] given her insurance  I called [phone] and spoke with Tish Griffin,  at [facility] [place]  She states she will check with  to ensure they take patient's insurance and call me back asap  If so, we should be able to get sooner appt for patient   Will await call back

## 2021-02-24 ENCOUNTER — TELEPHONE (OUTPATIENT)
Dept: VASCULAR SURGERY | Facility: CLINIC | Age: 26
End: 2021-02-24

## 2021-02-24 NOTE — TELEPHONE ENCOUNTER

## 2021-02-25 ENCOUNTER — CONSULT (OUTPATIENT)
Dept: VASCULAR SURGERY | Facility: CLINIC | Age: 26
End: 2021-02-25
Payer: COMMERCIAL

## 2021-02-25 ENCOUNTER — TELEPHONE (OUTPATIENT)
Dept: GASTROENTEROLOGY | Facility: CLINIC | Age: 26
End: 2021-02-25

## 2021-02-25 VITALS
TEMPERATURE: 98.2 F | HEIGHT: 66 IN | DIASTOLIC BLOOD PRESSURE: 82 MMHG | WEIGHT: 133 LBS | BODY MASS INDEX: 21.38 KG/M2 | SYSTOLIC BLOOD PRESSURE: 110 MMHG | HEART RATE: 135 BPM

## 2021-02-25 DIAGNOSIS — G89.29 CHRONIC ABDOMINAL PAIN: Primary | ICD-10-CM

## 2021-02-25 DIAGNOSIS — R10.9 CHRONIC ABDOMINAL PAIN: Primary | ICD-10-CM

## 2021-02-25 DIAGNOSIS — R10.13 EPIGASTRIC PAIN: ICD-10-CM

## 2021-02-25 DIAGNOSIS — R11.2 INTRACTABLE VOMITING WITH NAUSEA, UNSPECIFIED VOMITING TYPE: ICD-10-CM

## 2021-02-25 DIAGNOSIS — K55.1 SMAS (SUPERIOR MESENTERIC ARTERY SYNDROME) (HCC): ICD-10-CM

## 2021-02-25 DIAGNOSIS — R93.5 ABNORMAL ABDOMINAL CT SCAN: ICD-10-CM

## 2021-02-25 DIAGNOSIS — Z83.49 FAMILY HISTORY OF THYROID DISEASE: ICD-10-CM

## 2021-02-25 DIAGNOSIS — I87.1 NUTCRACKER PHENOMENON OF RENAL VEIN: Primary | ICD-10-CM

## 2021-02-25 PROCEDURE — 99243 OFF/OP CNSLTJ NEW/EST LOW 30: CPT | Performed by: SURGERY

## 2021-02-25 NOTE — ASSESSMENT & PLAN NOTE
Previously healthy female with 6 wk history of worsening nausea/vomiting, epigastric discomfort radiating up her chest, reflux symptoms, food intolerance after a party and 30lb weight loss over the last 6 weeks  Imaging work-up revealed possible SMA syndrome and nutcracker syndrome  CTAP - mesenteric vasculature widely patent and normal  Evidence of compression of the duodenum with acute angulation as narrow as 10 degrees between the SMA and aorta  There is some distension of the stomach and duodenum proximally as well     -Discussed the pathophysiology of SMA syndrome, treatment and management options  SMA syndrome is an uncommon diagnosis and is actually a misnomer in that there is not actual disease involving the vasculature  There is compression of the 3rd portion of the duodenum between the aorta and the SMA resulting in complete or partial duodenal obstruction, often caused by loss of mesenteric fat from rapid severe weight loss  -She has had gradual weight loss over time but went to a party and was ill afterwards with nausea/vomiting and poor intake  Unfortunately this persisted  Suspect her gradual weight loss compounded with sudden additional weight loss contributed to her symptoms  Continued poor intake likely led to continued loss of mesenteric fat and worsening symptoms   -Management of SMA syndrome usually involves providing adequate nutrition to increase the mesenteric fat  She seems to tolerate liquids but is only drinking clears such as apple juice  Would advise protein shakes, ensure, etc  for more nutrition  She may require nasogastric decompression and/or enteral feeding via feeding tube distal to the obstruction if unable to tolerate PO intake at all  Most important at this point is for her to have adequate nutritional support/caloric intake    -She has been prescribed prilosec and carafate but is only taking carafate currently   Advised to start prilosec as prescribed for her reflux symptoms as well   -Scheduled for gastric emptying study which will likely confirm her CT findings and has follow-up with GI physician next week  Abdominal US, HIDA, and EGD were unremarkable  Discussed that SMA syndrome is typically managed by gastroenterology and general surgery if necessary when conservative measures fail  There is no role for vascular surgery interventions in this clinical entity

## 2021-02-25 NOTE — LETTER
February 25, 2021     Fernanda Hernandez, [place] [address]  [address] [address]    Patient: Jhonathan Ge   YOB: 1995   Date of Visit: 2/25/2021       Dear Dr Ganesh Coronado: Thank you for referring Jhoanthan Ge to me for evaluation  Below are the relevant portions of my assessment and plan of care  Diagnoses and all orders for this visit:    SMAS (superior mesenteric artery syndrome) ([address] )  Previously healthy female with 6 wk history of worsening nausea/vomiting, epigastric discomfort radiating up her chest, reflux symptoms, food intolerance after a party and 30lb weight loss over the last 6 weeks  Imaging work-up revealed possible SMA syndrome and nutcracker syndrome      CTAP - mesenteric vasculature widely patent and normal  Evidence of compression of the duodenum with acute angulation as narrow as 10 degrees between the SMA and aorta  There is some distension of the stomach and duodenum proximally as well      -Discussed the pathophysiology of SMA syndrome, treatment and management options  SMA syndrome is an uncommon diagnosis and is actually a misnomer in that there is not actual disease involving the vasculature  There is compression of the 3rd portion of the duodenum between the aorta and the SMA resulting in complete or partial duodenal obstruction, often caused by loss of mesenteric fat from rapid severe weight loss  -She has had gradual weight loss over time but went to a party and was ill afterwards with nausea/vomiting and poor intake  Unfortunately this persisted  Suspect her gradual weight loss compounded with sudden additional weight loss contributed to her symptoms  Continued poor intake likely led to continued loss of mesenteric fat and worsening symptoms   -Management of SMA syndrome usually involves providing adequate nutrition to increase the mesenteric fat  She seems to tolerate liquids but is only drinking clears such as apple juice   Would advise protein shakes, ensure, etc  for more nutrition  She may require nasogastric decompression and/or enteral feeding via feeding tube distal to the obstruction if unable to tolerate PO intake at all  Most important at this point is for her to have adequate nutritional support/caloric intake    -She has been prescribed prilosec and carafate but is only taking carafate currently  Advised to start prilosec as prescribed for her reflux symptoms as well   -Scheduled for gastric emptying study which will likely confirm her CT findings and has follow-up with GI physician next week  Abdominal US, HIDA, and EGD were unremarkable  Discussed that SMA syndrome is typically managed by gastroenterology and general surgery if necessary when conservative measures fail  There is no role for vascular surgery interventions in this clinical entity      Nutcracker phenomenon of renal vein  Incidental finding of left renal vein compression by acute angulation between SMA and aorta on CTAP  This is also likely due to loss of mesenteric fat in her already thin body habitus  Fortunately, she is asymptomatic from this aspect  She denies left flank pain, hematuria or pelvic/lower extremity varicosities     -Discussed the pathophysiology of nutcracker syndrome, treatment and management options  There is no indication for any intervention for asymptomatic finding of left renal vein compression   -Will obtain complete renal duplex to follow left renal vein compression in 6 months with office follow-up  Hopefully she will gain adequate weight over the next few months which may help relieve some of the compression on the left renal vein as well  If you have questions, please do not hesitate to call me  I look forward to following Justyn Vásquez along with you           Sincerely,        Michael Lin MD        CC: Tom Grimes PA-C

## 2021-02-25 NOTE — ASSESSMENT & PLAN NOTE
Incidental finding of left renal vein compression by acute angulation between SMA and aorta on CTAP  This is also likely due to loss of mesenteric fat in her already thin body habitus  Fortunately, she is asymptomatic from this aspect  She denies left flank pain, hematuria or pelvic/lower extremity varicosities      -Discussed the pathophysiology of nutcracker syndrome, treatment and management options  There is no indication for any intervention for asymptomatic finding of left renal vein compression   -Will obtain complete renal duplex to follow left renal vein compression in 6 months with office follow-up  Hopefully she will gain adequate weight over the next few months which may help relieve some of the compression on the left renal vein as well

## 2021-02-25 NOTE — TELEPHONE ENCOUNTER
Spoke to patients mom Britney Veras, she said the reason she wants the labs is because she is concerned about a thyroid issue and her sister in law was diagnosed with thyroid disease and it was the antibody that showed it, it wasn't the TSH and T4  She said Kati Melissa was diagnosed by GI and Vascular that she has SMA syndrome  I asked her what that was and she wasn't sure   She would still like to have the labs done

## 2021-02-25 NOTE — PATIENT INSTRUCTIONS
SMAS (superior mesenteric artery syndrome) (HCC)  Previously healthy female with 6 wk history of worsening nausea/vomiting, epigastric discomfort radiating up her chest, reflux symptoms, food intolerance after a party and 30lb weight loss over the last 6 weeks  Imaging work-up revealed possible SMA syndrome and nutcracker syndrome      CTAP - mesenteric vasculature widely patent and normal  Evidence of compression of the duodenum with acute angulation as narrow as 10 degrees between the SMA and aorta  There is some distension of the stomach and duodenum proximally as well      -Discussed the pathophysiology of SMA syndrome, treatment and management options  SMA syndrome is an uncommon diagnosis and is actually a misnomer in that there is not actual disease involving the vasculature  There is compression of the 3rd portion of the duodenum between the aorta and the SMA resulting in complete or partial duodenal obstruction, often caused by loss of mesenteric fat from rapid severe weight loss  -She has had gradual weight loss over time but went to a party and was ill afterwards with nausea/vomiting and poor intake  Unfortunately this persisted  Suspect her gradual weight loss compounded with sudden additional weight loss contributed to her symptoms  Continued poor intake likely led to continued loss of mesenteric fat and worsening symptoms   -Management of SMA syndrome usually involves providing adequate nutrition to increase the mesenteric fat  She seems to tolerate liquids but is only drinking clears such as apple juice  Would advise protein shakes, ensure, etc  for more nutrition  She may require nasogastric decompression and/or enteral feeding via feeding tube distal to the obstruction if unable to tolerate PO intake at all  Most important at this point is for her to have adequate nutritional support/caloric intake    -She has been prescribed prilosec and carafate but is only taking carafate currently   Advised to start prilosec as prescribed for her reflux symptoms as well   -Scheduled for gastric emptying study which will likely confirm her CT findings and has follow-up with GI physician next week  Abdominal US, HIDA, and EGD were unremarkable  Discussed that SMA syndrome is typically managed by gastroenterology and general surgery if necessary when conservative measures fail  There is no role for vascular surgery interventions in this clinical entity      Nutcracker phenomenon of renal vein  Incidental finding of left renal vein compression by acute angulation between SMA and aorta on CTAP  This is also likely due to loss of mesenteric fat in her already thin body habitus  Fortunately, she is asymptomatic from this aspect  She denies left flank pain, hematuria or pelvic/lower extremity varicosities     -Discussed the pathophysiology of nutcracker syndrome, treatment and management options  There is no indication for any intervention for asymptomatic finding of left renal vein compression   -Will obtain complete renal duplex to follow left renal vein compression in 6 months with office follow-up  Hopefully she will gain adequate weight over the next few months which may help relieve some of the compression on the left renal vein as well

## 2021-02-25 NOTE — PROGRESS NOTES
Assessment/Plan:    SMAS (superior mesenteric artery syndrome) (HCC)  Previously healthy female with 6 wk history of worsening nausea/vomiting, epigastric discomfort radiating up her chest, reflux symptoms, food intolerance after a party and 30lb weight loss over the last 6 weeks  Imaging work-up revealed possible SMA syndrome and nutcracker syndrome  CTAP - mesenteric vasculature widely patent and normal  Evidence of compression of the duodenum with acute angulation as narrow as 10 degrees between the SMA and aorta  There is some distension of the stomach and duodenum proximally as well     -Discussed the pathophysiology of SMA syndrome, treatment and management options  SMA syndrome is an uncommon diagnosis and is actually a misnomer in that there is not actual disease involving the vasculature  There is compression of the 3rd portion of the duodenum between the aorta and the SMA resulting in complete or partial duodenal obstruction, often caused by loss of mesenteric fat from rapid severe weight loss  -She has had gradual weight loss over time but went to a party and was ill afterwards with nausea/vomiting and poor intake  Unfortunately this persisted  Suspect her gradual weight loss compounded with sudden additional weight loss contributed to her symptoms  Continued poor intake likely led to continued loss of mesenteric fat and worsening symptoms   -Management of SMA syndrome usually involves providing adequate nutrition to increase the mesenteric fat  She seems to tolerate liquids but is only drinking clears such as apple juice  Would advise protein shakes, ensure, etc  for more nutrition  She may require nasogastric decompression and/or enteral feeding via feeding tube distal to the obstruction if unable to tolerate PO intake at all   Most important at this point is for her to have adequate nutritional support/caloric intake    -She has been prescribed prilosec and carafate but is only taking carafate currently  Advised to start prilosec as prescribed for her reflux symptoms as well   -Scheduled for gastric emptying study which will likely confirm her CT findings and has follow-up with GI physician next week  Abdominal US, HIDA, and EGD were unremarkable  Discussed that SMA syndrome is typically managed by gastroenterology and general surgery if necessary when conservative measures fail  There is no role for vascular surgery interventions in this clinical entity  Nutcracker phenomenon of renal vein  Incidental finding of left renal vein compression by acute angulation between SMA and aorta on CTAP  This is also likely due to loss of mesenteric fat in her already thin body habitus  Fortunately, she is asymptomatic from this aspect  She denies left flank pain, hematuria or pelvic/lower extremity varicosities      -Discussed the pathophysiology of nutcracker syndrome, treatment and management options  There is no indication for any intervention for asymptomatic finding of left renal vein compression   -Will obtain complete renal duplex to follow left renal vein compression in 6 months with office follow-up  Hopefully she will gain adequate weight over the next few months which may help relieve some of the compression on the left renal vein as well  Diagnoses and all orders for this visit:    Nutcracker phenomenon of renal vein  -     VAS renal artery complete; Future    Epigastric pain  -     Ambulatory referral to Vascular Surgery    Intractable vomiting with nausea, unspecified vomiting type  -     Ambulatory referral to Vascular Surgery    Abnormal abdominal CT scan  -     Ambulatory referral to Vascular Surgery    SMAS (superior mesenteric artery syndrome) (HCC)        I have spent 30 minutes with Patient  today in which greater than 50% of this time was spent in counseling/coordination of care regarding Intructions for management, Importance of tx compliance and Impressions      Subjective:      Patient ID: Saroj Acosta is a 23 y o  female  Pt is new to our office  Pt was referred by Edel Gilbert PA-C to be evaluated for SMA syndrome  Pt is here to review results of CT abdomen with contrast  She c/o constant pain in her esophagus and stomach  She also c/o constant nausea and vomiting  Pt has been ill since January/2021 and has since lost 30 lbs  Pt is a former cigarette smoker  She does smoke marijuana  HPI  Ms Jovon Agee is a 23yo former smoker, marijuana user, previously healthy female with a 6 wk history of worsening nausea/vomiting, epigastric discomfort radiating up her chest, reflux symptoms, food intolerance which started after attending a party  She had alcohol at the party and she and her family thought she was hungover and unwell due to excessive drinking  However her symptoms persisted and actually continued to get worse  She had a 30lb weight loss due to inability to hold food down  She could feel the food go down to her stomach but not pass through and would bring it all up  She subsequently saw gastroenterology  Abdominal US and HIDA scan were unremarkable  She underwent an EGD early February which was also unremarkable  Imaging work-up revealed possible SMA syndrome and nutcracker syndrome and she presents now for further evaluation  The following portions of the patient's history were reviewed and updated as appropriate: allergies, current medications, past family history, past medical history, past social history, past surgical history and problem list     I have reviewed and made appropriate changes to the review of systems input by the medical assistant      Vitals:    02/25/21 0946   BP: 110/82   BP Location: Left arm   Patient Position: Sitting   Cuff Size: Standard   Pulse: (!) 135   Temp: 98 2 °F (36 8 °C)   TempSrc: Tympanic   Weight: 60 3 kg (133 lb)   Height: 5' 6" (1 676 m)       Patient Active Problem List   Diagnosis    Depression    Recurrent cold sores    Uses oral contraception    Intermittent low back pain    Marijuana use, continuous    SMAS (superior mesenteric artery syndrome) (HCC)    Nutcracker phenomenon of renal vein       Past Surgical History:   Procedure Laterality Date    TOOTH EXTRACTION      UPPER GASTROINTESTINAL ENDOSCOPY         Family History   Problem Relation Age of Onset    Hypothyroidism Mother     Kidney cancer Maternal Grandmother     Lymphoma Maternal Grandfather     Breast cancer Paternal Grandmother     Lung cancer Paternal Grandfather     Alpha-1 antitrypsin deficiency Family        Social History     Socioeconomic History    Marital status: Single     Spouse name: Not on file    Number of children: Not on file    Years of education: Not on file    Highest education level: Not on file   Occupational History    Not on file   Social Needs    Financial resource strain: Not hard at all   [place] insecurity     Worry: Never true     Inability: Never true   [employer] needs     Medical: No     Non-medical: No   Tobacco Use    Smoking status: Former Smoker     Types: Cigarettes    Smokeless tobacco: Never Used   Substance and Sexual Activity    Alcohol use: No    Drug use: Yes     Types: Marijuana    Sexual activity: Yes     Partners: Male   Lifestyle    Physical activity     Days per week: 0 days     Minutes per session: 0 min    Stress: Very much   Relationships    Social connections     Talks on phone: Once a week     Gets together: Once a week     Attends Rastafari service: Never     Active member of club or organization: No     Attends meetings of clubs or organizations: Never     Relationship status: Never     Intimate partner violence     Fear of current or ex partner: No     Emotionally abused: No     Physically abused: No     Forced sexual activity: No   Other Topics Concern    Not on file   Social History Narrative    Not on file       Allergies   Allergen Reactions    Pollen Extract Current Outpatient Medications:     cyclobenzaprine (FLEXERIL) 10 mg tablet, TAKE 1 TABLET BY MOUTH DAILY AT BEDTIME, Disp: 30 tablet, Rfl: 0    ISIBLOOM 0 15-30 MG-MCG per tablet, TAKE 1 TABLET BY MOUTH DAILY, Disp: 84 tablet, Rfl: 3    omeprazole (PriLOSEC) 40 MG capsule, TAKE 1 CAPSULE(40 MG) BY MOUTH DAILY, Disp: 30 capsule, Rfl: 3    ondansetron (ZOFRAN-ODT) 4 mg disintegrating tablet, Take 4 mg by mouth every 8 (eight) hours as needed, Disp: , Rfl:     promethazine (Promethegan) 25 mg suppository, Insert 1 suppository (25 mg total) into the rectum every 6 (six) hours as needed for nausea or vomiting, Disp: 30 suppository, Rfl: 2    sucralfate (CARAFATE) 1 g tablet, Take 1 tablet (1 g total) by mouth 3 (three) times a day, Disp: 90 tablet, Rfl: 3    traMADol (ULTRAM) 50 mg tablet, Take 1 tablet (50 mg total) by mouth every 12 (twelve) hours as needed for moderate pain or severe pain, Disp: 30 tablet, Rfl: 0    dicyclomine (BENTYL) 10 mg capsule, Take 1 capsule (10 mg total) by mouth 4 (four) times a day as needed (abdominal pain) (Patient not taking: Reported on 2/25/2021), Disp: 30 capsule, Rfl: 2    valACYclovir (VALTREX) 1,000 mg tablet, Take 2 tablets (2,000 mg total) by mouth 2 (two) times a day for 2 days, Disp: 8 tablet, Rfl: 2    Review of Systems   Constitutional: Positive for fatigue and unexpected weight change (30lb wt loss)  HENT: Negative  Eyes: Negative  Respiratory: Negative  Cardiovascular: Negative  Gastrointestinal: Positive for abdominal pain, nausea and vomiting  Endocrine: Negative  Genitourinary: Positive for menstrual problem  Musculoskeletal: Negative  Skin: Negative  Allergic/Immunologic: Negative  Neurological: Positive for dizziness  Hematological: Bruises/bleeds easily  Psychiatric/Behavioral: The patient is nervous/anxious  I have personally reviewed the ROS entered by MA and agree as documented        Objective:      BP 110/82 (BP Location: Left arm, Patient Position: Sitting, Cuff Size: Standard)   Pulse (!) 135   Temp 98 2 °F (36 8 °C) (Tympanic)   Ht 5' 6" (1 676 m)   Wt 60 3 kg (133 lb)   BMI 21 47 kg/m²          Physical Exam  Vitals signs and nursing note reviewed  Constitutional:       Appearance: Normal appearance  She is well-developed  HENT:      Head: Normocephalic and atraumatic  Neck:      Musculoskeletal: Normal range of motion and neck supple  Cardiovascular:      Rate and Rhythm: Regular rhythm  Tachycardia present  Pulses:           Radial pulses are 2+ on the right side and 2+ on the left side  Femoral pulses are 2+ on the right side and 2+ on the left side  Popliteal pulses are 1+ on the right side and 1+ on the left side  Dorsalis pedis pulses are 1+ on the right side and 1+ on the left side  Posterior tibial pulses are 1+ on the right side and 1+ on the left side  Pulmonary:      Effort: Pulmonary effort is normal    Abdominal:      General: There is no distension  Palpations: Abdomen is soft  There is no mass  Tenderness: There is no abdominal tenderness  There is no right CVA tenderness, left CVA tenderness, guarding or rebound  Musculoskeletal: Normal range of motion  Right lower leg: No edema  Left lower leg: No edema  Skin:     General: Skin is warm and dry  Capillary Refill: Capillary refill takes less than 2 seconds  Neurological:      General: No focal deficit present  Mental Status: She is alert and oriented to person, place, and time  Psychiatric:         Mood and Affect: Mood normal          Behavior: Behavior normal          Thought Content:  Thought content normal          Judgment: Judgment normal

## 2021-02-25 NOTE — TELEPHONE ENCOUNTER
No problem  Thyroid testing is in for TSH, Free T4 and thyroid antibody panel  Go first thing in AM, no fasting needed

## 2021-02-25 NOTE — TELEPHONE ENCOUNTER
Spoke to patients mother Angelitafreya Santiago and reviewed vasculars note  The goal is to ensure the patient is gaining weight but managing her pain while she does so  Angelita Pamela states that carafate provides relief for about 40 minutes and the Ultram does not help at all  They were also given bentyl but after taking this the patient needed to go to the ER due to tachycardia  The patient states that after eating and prior to vomiting she feels a lot of spasms within her stomach and esophagus  She denies nausea  Discussed continuing carafate as this does provide some relief and adding IBD guard as bentyl caused tachycardia  We also discussed potential NGT  Will await further input from Dr Mitch Linares

## 2021-02-25 NOTE — TELEPHONE ENCOUNTER
Patient of Jhon Hsu, last seen 2/17/21, colonoscopy by Dr Jalen Shaw on 2/3/21    History of epigastric pain, intractable vomiting with nausea, abnormal abdominal CT, weight loss, SMA syndrome    See 2/18 encounter for further information    Received call from patient's mother, Beryl Petit  They were able to see vascular this morning, who confirmed a diagnosis of SMA syndrome  Per patient's mother, they told her the most important thing is for patient to gain weight, as the weight loss has exacerbated the SMA syndrome  Patient's mother feels that patient will not be able to gain weight until we get patient's pain under control  She is unable to eat d/t her epigastric pain, nausea and vomiting  Patient is taking ultram q 12 hours as prescribed by us, but patient's mother stated it is not enough, it only helps a small amount and doesn't last long  She states she knows we are hesitant to prescribe more pain meds but she states she is desperate to get help for her daughter and the only thing that will help patient eat is controlling her pain, which will in turn control her nausea/vomiting  She asked to speak to [facility], who is off today, so I let her know I would reach out to another provider  Would somebody be able to call her at [phone] to discuss her concerns?

## 2021-02-26 ENCOUNTER — TELEPHONE (OUTPATIENT)
Dept: GASTROENTEROLOGY | Facility: CLINIC | Age: 26
End: 2021-02-26

## 2021-02-26 DIAGNOSIS — R07.9 CHEST PAIN, UNSPECIFIED TYPE: ICD-10-CM

## 2021-02-26 DIAGNOSIS — R10.13 EPIGASTRIC PAIN: ICD-10-CM

## 2021-02-26 RX ORDER — TRAMADOL HYDROCHLORIDE 50 MG/1
50 TABLET ORAL EVERY 8 HOURS PRN
Qty: 90 TABLET | Refills: 3 | Status: SHIPPED | OUTPATIENT
Start: 2021-02-26 | End: 2021-03-17

## 2021-02-26 NOTE — TELEPHONE ENCOUNTER
Prior authorization for the medication Tramadol 50 mg tablet is pending  Authorization done through cover my meds  Key to check the status is [id number]  Waiting for response

## 2021-03-08 ENCOUNTER — TELEPHONE (OUTPATIENT)
Dept: INTERNAL MEDICINE CLINIC | Facility: CLINIC | Age: 26
End: 2021-03-08

## 2021-03-17 ENCOUNTER — OFFICE VISIT (OUTPATIENT)
Dept: GASTROENTEROLOGY | Facility: CLINIC | Age: 26
End: 2021-03-17
Payer: COMMERCIAL

## 2021-03-17 VITALS
TEMPERATURE: 97.9 F | HEIGHT: 66 IN | DIASTOLIC BLOOD PRESSURE: 60 MMHG | WEIGHT: 144.6 LBS | HEART RATE: 96 BPM | BODY MASS INDEX: 23.24 KG/M2 | SYSTOLIC BLOOD PRESSURE: 98 MMHG

## 2021-03-17 DIAGNOSIS — R11.2 INTRACTABLE VOMITING WITH NAUSEA, UNSPECIFIED VOMITING TYPE: Primary | ICD-10-CM

## 2021-03-17 DIAGNOSIS — R93.5 ABNORMAL ABDOMINAL CT SCAN: ICD-10-CM

## 2021-03-17 PROCEDURE — 1036F TOBACCO NON-USER: CPT | Performed by: INTERNAL MEDICINE

## 2021-03-17 PROCEDURE — 3008F BODY MASS INDEX DOCD: CPT | Performed by: INTERNAL MEDICINE

## 2021-03-17 PROCEDURE — 99214 OFFICE O/P EST MOD 30 MIN: CPT | Performed by: INTERNAL MEDICINE

## 2021-03-17 PROCEDURE — 3008F BODY MASS INDEX DOCD: CPT | Performed by: PHYSICIAN ASSISTANT

## 2021-03-17 NOTE — PATIENT INSTRUCTIONS
Stop prilosec  - take 1 pill every other day x 1 week  - take 1 pill every 3 days x 1 week  - then stop

## 2021-03-18 NOTE — PROGRESS NOTES
[facility] - Outpatient Consultation  Melisa Rojelio 23 y o  female MRN: [id number]  Encounter: [id number]      PCP: Amadeo Brooks PA-C  Referring: No referring provider defined for this encounter  ASSESSMENT AND PLAN:      1  Intractable vomiting with nausea, unspecified vomiting type  2  Abnormal abdominal CT scan  Subacute onset of nausea/vomiting associated with weight loss, resulting in SMA syndrome  Reassurance provided by Dr Lawanda Granados from vascular surgery  She had initial benefit from tramadol to improve her abdominal pain, which helped her tolerate oral intake  She continues to use nutritional supplements  She is now off of tramadol, and feels her symptoms have significantly improved, her nausea/vomiting is resolved, and she has started to regain her initial weight loss  Titrate off PPI      ______________________________________________________________________    CC:  Chief Complaint   Patient presents with    Follow-up     EGD 2/3/21, Ct Scan 2/11/21, US 2/7/21, Hepatobiliary Scan 2/22/21  HPI:      Patient is a 46-year-old female who sees our office in follow-up for nausea/vomiting, abdominal pain, abnormal CT imaging  She is present with her mother, who contributes to history  She has been seen for the last two months for subacute onset of abdominal pain, nausea/vomiting, initially by our physician assistant, Sylvie Moran  She underwent EGD at the end of January by Dr Jesse Griffin, which was within normal limits, with biopsies ruling out celiac disease, H pylori  Subsequent CT scan demonstrated findings compatible with SMA syndrome  She was referred for two vascular surgery, and was seen by Dr Lawanda Granados, who was reassuring  HIDA scan with CCK was normal  She was prescribed tramadol to help with abdominal pain, she feels that this significantly improved her symptoms, and improved her tolerability of oral intake    She has started with [place], Ensure and protein powders, which has helped her regain her initial weight loss  She has titrated off the tramadol  She feels reassured  She has no further symptoms  Gastric emptying study was ordered and deferred  REVIEW OF SYSTEMS:    CONSTITUTIONAL: Denies any fever, chills, rigors, and weight loss  HEENT: No earache or tinnitus  Denies hearing loss or visual disturbances  CARDIOVASCULAR: No chest pain or palpitations  RESPIRATORY: Denies any cough, hemoptysis, shortness of breath or dyspnea on exertion  GASTROINTESTINAL: As noted in the History of Present Illness  GENITOURINARY: No problems with urination  Denies any hematuria or dysuria  NEUROLOGIC: No dizziness or vertigo, denies headaches  MUSCULOSKELETAL: Denies any muscle or joint pain  SKIN: Denies skin rashes or itching  ENDOCRINE: Denies excessive thirst  Denies intolerance to heat or cold  PSYCHOSOCIAL: Denies depression or anxiety  Denies any recent memory loss  Historical Information   History reviewed  No pertinent past medical history    Past Surgical History:   Procedure Laterality Date    TOOTH EXTRACTION      UPPER GASTROINTESTINAL ENDOSCOPY       Social History   Social History     Substance and Sexual Activity   Alcohol Use No     Social History     Substance and Sexual Activity   Drug Use Yes    Types: Marijuana     Social History     Tobacco Use   Smoking Status Former Smoker    Types: Cigarettes   Smokeless Tobacco Never Used     Family History   Problem Relation Age of Onset    Hypothyroidism Mother     Kidney cancer Maternal Grandmother     Lymphoma Maternal Grandfather     Breast cancer Paternal Grandmother     Lung cancer Paternal Grandfather     Alpha-1 antitrypsin deficiency Family        Meds/Allergies       Current Outpatient Medications:     omeprazole (PriLOSEC) 40 MG capsule    ondansetron (ZOFRAN-ODT) 4 mg disintegrating tablet    valACYclovir (VALTREX) 1,000 mg tablet    Allergies   Allergen Reactions    Pollen Extract            Objective     Blood pressure 98/60, pulse 96, temperature 97 9 °F (36 6 °C), temperature source Tympanic, height 5' 6" (1 676 m), weight 65 6 kg (144 lb 9 6 oz)  Body mass index is 23 34 kg/m²  PHYSICAL EXAM:      General Appearance:   Alert, cooperative, no distress   HEENT:   Normocephalic, atraumatic, anicteric  Neck:  Supple, symmetrical, trachea midline   Lungs:   Clear to auscultation bilaterally; no rales, rhonchi or wheezing; respirations unlabored    Heart[de-identified]   Regular rate and rhythm; no murmur, rub, or gallop  Abdomen:   Soft, non-tender, non-distended; normal bowel sounds; no masses, no organomegaly    Genitalia:   Deferred    Rectal:   Deferred    Extremities:  No cyanosis, clubbing or edema    Pulses:  2+ and symmetric    Skin:  No jaundice, rashes, or lesions    Lymph nodes:  No palpable cervical lymphadenopathy        Lab Results:     Lab Results   Component Value Date    WBC 8 16 08/06/2018    HGB 14 1 08/06/2018    HCT 42 9 08/06/2018    MCV 95 08/06/2018     08/06/2018       Lab Results   Component Value Date    K 3 8 08/06/2018     08/06/2018    CO2 27 08/06/2018    BUN 10 08/06/2018    CREATININE 0 85 08/06/2018    CALCIUM 9 4 08/06/2018    AST 13 08/06/2018    ALT 23 08/06/2018    ALKPHOS 20 (L) 08/06/2018    EGFR 98 08/06/2018       No results found for: INR, PROTIME      Radiology Results:   Nm Hepatobiliary W Rx    Result Date: 2/22/2021  Narrative: HEPATOBILIARY SCAN WITH CHOLECYSTOKININ ADMINISTRATION INDICATION: R10 13: Epigastric pain R11 2: Nausea with vomiting, unspecified COMPARISON:  CT 2/11/2021 TECHNIQUE:   Following the intravenous administration of 5 mCi Tc-99m labeled mebrofenin, dynamic abdominal images were obtained over a 60 minute time period  Images were performed in AP projection  FINDINGS: There is prompt, uniform accumulation with normal clearance of the radiopharmaceutical by the liver   There is normal filling of the intrahepatic ducts, common bile duct and gallbladder with normal excretion of the radiopharmaceutical into the duodenum  In order to evaluate the contractile response of the gallbladder to  cholecystokinin stimulation, 1 3 mcg sincalide (0 02 mcg/kg) was administered by slow intravenous infusion over 60 minutes  These images demonstrate normal contraction of the gallbladder  The calculated gallbladder ejection fraction is 68 % (N = >38%)  The patient experienced no symptoms after CCK administration  Impression: 1  Normal hepatobiliary study  2  Normal contractile response of the gallbladder to cholecystokinin infusion   (68%) Workstation performed: [id number]         Portions of the record may have been created with voice recognition software  Occasional wrong word or "sound a like" substitutions may have occurred due to the inherent limitations of voice recognition software  Read the chart carefully and recognize, using context, where substitutions have occurred

## 2021-03-29 ENCOUNTER — TELEPHONE (OUTPATIENT)
Dept: GASTROENTEROLOGY | Facility: CLINIC | Age: 26
End: 2021-03-29

## 2021-03-29 ENCOUNTER — TELEPHONE (OUTPATIENT)
Dept: INTERNAL MEDICINE CLINIC | Facility: CLINIC | Age: 26
End: 2021-03-29

## 2021-03-29 NOTE — TELEPHONE ENCOUNTER
Pt's mother called stating [facility] control ISIBLOOM 0 15-30 MG-MCG per tablet   Was canceled by Dr Josselyn Serna and unsure why  Pt needed to start taking it yd 3/28/21  Please advise

## 2021-03-29 NOTE — TELEPHONE ENCOUNTER
I can certainly fill her medication temporarily, however we do not have it in her active medication list, typically we remove it when patient stops the medication and says they are not taking it any more  Please clarify with patient if she has been taking it the entire time or if she stopped it for certain period of time and why,   so I can have an understanding of what is going on and we can educate her how and when to restart it  I did provide her with a referral to gyn when I saw her 1 time November 2019  Please see if patient has a gyn in mind or if she would like recommendation again

## 2021-03-29 NOTE — TELEPHONE ENCOUNTER
Patient is currently out of birth control (ISIBLOOM 0 15-30 MG-MCG per tablet)  and attempting to establish care with a new OBGYN  She is asking if PCP Maria Elena Olivera can prescribe birth control to hold her over in the interim

## 2021-03-30 NOTE — TELEPHONE ENCOUNTER
Left vm, Dr Bebeto Merrill did not cancel medication; documented she was not taking  It looks like another physician is in process of ordering though (in epic)  If have further questions, please cb

## 2021-03-31 DIAGNOSIS — Z30.41 USES ORAL CONTRACEPTION: Primary | ICD-10-CM

## 2021-03-31 RX ORDER — DESOGESTREL AND ETHINYL ESTRADIOL 0.15-0.03
1 KIT ORAL DAILY
Qty: 28 TABLET | Refills: 2 | Status: SHIPPED | OUTPATIENT
Start: 2021-03-31 | End: 2021-06-15 | Stop reason: SDUPTHER

## 2021-03-31 NOTE — TELEPHONE ENCOUNTER
Patient called back, she said she was sick and vomiting a lot and didn't take the birth control January and February because she didn't want to waste it  She restarted it the beginning of March  She said she has the name of a GYN to establish care but has not called to make an appt so I told her she needs to set that up

## 2021-03-31 NOTE — TELEPHONE ENCOUNTER
Okay, no problem    Medication refill sent to her pharmacy enough for 3 months (1 pack with 2 refills)

## 2021-05-19 ENCOUNTER — APPOINTMENT (OUTPATIENT)
Dept: LAB | Facility: MEDICAL CENTER | Age: 26
End: 2021-05-19
Payer: COMMERCIAL

## 2021-05-19 DIAGNOSIS — Z83.49 FAMILY HISTORY OF THYROID DISEASE: ICD-10-CM

## 2021-05-19 DIAGNOSIS — R10.9 CHRONIC ABDOMINAL PAIN: ICD-10-CM

## 2021-05-19 DIAGNOSIS — R11.2 INTRACTABLE VOMITING WITH NAUSEA, UNSPECIFIED VOMITING TYPE: ICD-10-CM

## 2021-05-19 DIAGNOSIS — G89.29 CHRONIC ABDOMINAL PAIN: ICD-10-CM

## 2021-05-19 LAB
T4 FREE SERPL-MCNC: 0.97 NG/DL (ref 0.76–1.46)
TSH SERPL DL<=0.05 MIU/L-ACNC: 1.32 UIU/ML (ref 0.36–3.74)

## 2021-05-19 PROCEDURE — 84439 ASSAY OF FREE THYROXINE: CPT

## 2021-05-19 PROCEDURE — 84443 ASSAY THYROID STIM HORMONE: CPT

## 2021-05-19 PROCEDURE — 86376 MICROSOMAL ANTIBODY EACH: CPT

## 2021-05-19 PROCEDURE — 36415 COLL VENOUS BLD VENIPUNCTURE: CPT

## 2021-05-19 PROCEDURE — 86800 THYROGLOBULIN ANTIBODY: CPT

## 2021-05-20 LAB
THYROGLOB AB SERPL-ACNC: 13.2 IU/ML (ref 0–0.9)
THYROPEROXIDASE AB SERPL-ACNC: <9 IU/ML (ref 0–34)

## 2021-06-04 ENCOUNTER — OFFICE VISIT (OUTPATIENT)
Dept: INTERNAL MEDICINE CLINIC | Facility: CLINIC | Age: 26
End: 2021-06-04

## 2021-06-04 VITALS
HEART RATE: 85 BPM | SYSTOLIC BLOOD PRESSURE: 105 MMHG | DIASTOLIC BLOOD PRESSURE: 68 MMHG | HEIGHT: 66 IN | TEMPERATURE: 97.7 F | BODY MASS INDEX: 24.11 KG/M2 | WEIGHT: 150 LBS | OXYGEN SATURATION: 98 %

## 2021-06-04 DIAGNOSIS — K55.1 SMAS (SUPERIOR MESENTERIC ARTERY SYNDROME) (HCC): ICD-10-CM

## 2021-06-04 DIAGNOSIS — Z00.00 ROUTINE ADULT HEALTH MAINTENANCE: Primary | ICD-10-CM

## 2021-06-04 DIAGNOSIS — R76.8 THYROID ANTIBODY POSITIVE: ICD-10-CM

## 2021-06-04 PROCEDURE — 99395 PREV VISIT EST AGE 18-39: CPT | Performed by: PHYSICIAN ASSISTANT

## 2021-06-04 PROCEDURE — 1036F TOBACCO NON-USER: CPT | Performed by: PHYSICIAN ASSISTANT

## 2021-06-04 NOTE — PROGRESS NOTES
Assessment/Plan:      Diagnoses and all orders for this visit:    Routine adult health maintenance    SMAS (superior mesenteric artery syndrome) (HCC)    Thyroid antibody positive  -     US thyroid; Future        Patient is a very pleasant 27-year-old female presenting today for annual physical       Unfortunately several months ago starting in January patient had severe abdominal pain, nausea, vomiting and unable to keep anything down  After further workup with GI patient was found to have SMA,  There was compression of the duodenum with acute angulation as narrow is 10° between the SMA and aorta, some distention of the stomach and duodenum on testing  She did follow-up with GI as well as vascular  Ultrasound, HIDA an EGD were essentially unremarkable patient also found to have nutcracker phenomenon of the renal vein  patient overall doing very well with no symptoms, eating smaller portions and being cognizant of her diet  She is able to tolerate food well without any issues or recurring symptoms which is excellent  Patient to obtain renal duplex of the left renal vein in 6 months plan by vascular  Age-appropriate education regarding screenings and prevention were addressed with patient today regarding her health maintenance visit  Patient unfortunately never got routine labs done ordered in June of last year however she has since gotten some blood work done which have been fine without any significant abnormalities  I did review patient's thyroid blood work results with her today,  Which included normal free T4 and TSH  Thyroid microsomal antibody was normal however anti thyroglobulin antibody was mildly elevated at 13 2  The following was discussed with her in documented in after visit summary to explain what this may mean and recommendations:      We discussed with you today that your thyroid stimulating hormone and free T4 thyroid circulating hormone were both normal   However 1 of 2 of your thyroid antibodies did come back slightly positive  We discussed that there could be a possibility this is just a false positive test since only 1 of them came back slightly positive  We also discussed that this could potentially be antibody from the thyroid and the start of thyroid disease like Graves disease  However I would consider this to be euthyroid currently or neutral functioning thyroid since your TSH and free T4 were normal   The only way we would treat you for thyroid diseases if your TSH and free T4 were abnormal       I would recommend getting an ultrasound to check the structure of the thyroid 1st       If that is normal then we will just proceed with checking thyroid studies including antibodies again, TSH and free T4 in 4-6 months  I will also including HIV screen at that time which is recommended for all patients  Patient agreeable to plan to obtain thyroid ultrasound  If that is normal then we will pursue repeat thyroid antibodies, TSH and free T4 in 4-6 months  Will also include HIV screen at that time as patient never got blood work done from last year  Recommended updating a dental cleaning S patient is trying to pursue dental insurance  Eye exam is up-to-date  She does have an upcoming OBGYN appointment on 06/15  Pap testing last performed in 2018 and will defer recommendations for cervical cancer screening to the gyn  Patient does need 3rd and final HPV vaccine which can be administered at gyn office as well as chlamydia screening  Dietary recommendations, regular exercise, water for hydration were all discussed with patient today  She would like to consider COVID vaccine and we will have   Staff assists her setting up appointment upon leaving the office today  For now we will have patient follow-up here in the office in 1 year for annual physical, sooner if any problems arise    I told patient she should try to have a thyroid ultrasound done within the next 1-2 months and we will call her with results and determine next step  Again, we will plan to repeat TSH, free T4, thyroid antibodies as well as HIV screening in 4-6 months  Chief Complaint   Patient presents with    Annual Exam       Subjective:     Patient ID: Bard Umanzor is a 23 y o  female  24y/o femal here today for annual physical     She has not had any GI issues, reoccurring N/V or abd pain since f/u with GI and vascular for SMAS  She feels well, states eating better diet  She denies any other changes to her health, recent hospitalizations, not taking any medication currently aside from oral BC  Last dental cleaning about 1 year  Ago  Brushes teeth twice a day  Last eye exam: jan 2021 - wears glasses  Needed stronger prescription  She is eating better, eating 3-4 small meals a day  She is eating more fruits/vegetables, avoiding fatty foods/fried foods  Drinking water daily, sometimes iced tea, rare soda  She is active at work, no formal exercise  She has appt scheduled with GYN 6/15  Sexually active with same male partner - monogamous relationship  LMP: 5/18/21  Cycles with oral BC placebo  Average bleeding  Denies vaginal or urinary complaints  Lives in home with mom, dad and brother  Smoke alarms UTD  Wearing seatbelt in the car  Feels safe in relationship  Denies tobacco use  Smokes marijuana once daily before bed for sleep  ETOH: 1 drink a week  denies drug use  Review of Systems   Constitutional: Negative  HENT: Negative  Eyes: Negative  Respiratory: Negative  Cardiovascular: Negative  Gastrointestinal: Negative  Genitourinary: Negative  Musculoskeletal: Negative  Skin: Negative  Neurological: Negative  Psychiatric/Behavioral: Positive for sleep disturbance           The following portions of the patient's history were reviewed and updated as appropriate: allergies, current medications, past family history, past medical history, past social history, past surgical history and problem list       Objective:     Physical Exam  Vitals signs reviewed  Constitutional:       General: She is not in acute distress  Appearance: Normal appearance  She is not ill-appearing or toxic-appearing  HENT:      Head: Normocephalic and atraumatic  Right Ear: Tympanic membrane, ear canal and external ear normal  There is no impacted cerumen  Left Ear: Tympanic membrane, ear canal and external ear normal  There is no impacted cerumen  Nose: Nose normal       Mouth/Throat:      Pharynx: Oropharynx is clear  Eyes:      General:         Right eye: No discharge  Left eye: No discharge  Extraocular Movements: Extraocular movements intact  Conjunctiva/sclera: Conjunctivae normal       Pupils: Pupils are equal, round, and reactive to light  Comments: Wearing glasses   Neck:      Musculoskeletal: Neck supple  Normal range of motion  No pain with movement  Thyroid: No thyroid mass, thyromegaly or thyroid tenderness  Vascular: Normal carotid pulses  No carotid bruit  Trachea: Phonation normal    Cardiovascular:      Rate and Rhythm: Normal rate and regular rhythm  Pulses: Normal pulses  Heart sounds: Normal heart sounds  Pulmonary:      Effort: Pulmonary effort is normal       Breath sounds: Normal breath sounds  Abdominal:      General: Abdomen is flat  Bowel sounds are normal       Palpations: Abdomen is soft  Tenderness: There is no abdominal tenderness  Musculoskeletal:      Right lower leg: No edema  Left lower leg: No edema  Comments:  Gross normal appearance and observation of movement in patient's spine, upper and lower extremities without any obvious abnormality, pain or limitation  Lymphadenopathy:      Head:      Right side of head: No submandibular or tonsillar adenopathy  Left side of head: No submandibular or tonsillar adenopathy  Skin:     Findings: No lesion or rash  Neurological:      Mental Status: She is alert and oriented to person, place, and time  Motor: Motor function is intact  Coordination: Coordination is intact  Gait: Gait is intact  Deep Tendon Reflexes:      Reflex Scores:       Brachioradialis reflexes are 2+ on the right side and 2+ on the left side  Patellar reflexes are 2+ on the right side and 2+ on the left side  Psychiatric:         Mood and Affect: Mood normal  Mood is not anxious or depressed  Speech: Speech normal          Behavior: Behavior normal  Behavior is cooperative           Vitals:    06/04/21 1038   BP: 105/68   BP Location: Right arm   Patient Position: Sitting   Cuff Size: Standard   Pulse: 85   Temp: 97 7 °F (36 5 °C)   TempSrc: Temporal   SpO2: 98%   Weight: 68 kg (150 lb)   Height: 5' 6" (1 676 m)

## 2021-06-04 NOTE — PATIENT INSTRUCTIONS
So we discussed with you today that your thyroid stimulating hormone and free T4 thyroid circulating hormone were both normal   However 1 of 2 of your thyroid antibodies did come back slightly positive  We discussed that there could be a possibility this is just a false positive test since only 1 of them came back slightly positive  We also discussed that this could potentially be antibody from the thyroid and the start of thyroid disease like Graves disease  However I would consider this to be euthyroid currently or neutral functioning thyroid since your TSH and free T4 were normal   The only way we would treat you for thyroid diseases if your TSH and free T4 were abnormal       I would recommend getting an ultrasound to check the structure of the thyroid 1st       If that is normal then we will just proceed with checking thyroid studies including antibodies again, TSH and free T4 in 4-6 months  I will also including HIV screen at that time which is recommended for all patients

## 2021-06-15 ENCOUNTER — ANNUAL EXAM (OUTPATIENT)
Dept: OBGYN CLINIC | Facility: CLINIC | Age: 26
End: 2021-06-15
Payer: COMMERCIAL

## 2021-06-15 VITALS
WEIGHT: 152.2 LBS | SYSTOLIC BLOOD PRESSURE: 118 MMHG | HEIGHT: 66 IN | DIASTOLIC BLOOD PRESSURE: 64 MMHG | BODY MASS INDEX: 24.46 KG/M2

## 2021-06-15 DIAGNOSIS — Z76.89 ENCOUNTER TO ESTABLISH CARE: Primary | ICD-10-CM

## 2021-06-15 DIAGNOSIS — Z30.41 USES ORAL CONTRACEPTION: ICD-10-CM

## 2021-06-15 DIAGNOSIS — Z30.011 VISIT FOR ORAL CONTRACEPTIVE PRESCRIPTION: ICD-10-CM

## 2021-06-15 PROCEDURE — 99203 OFFICE O/P NEW LOW 30 MIN: CPT | Performed by: NURSE PRACTITIONER

## 2021-06-15 PROCEDURE — 1036F TOBACCO NON-USER: CPT | Performed by: NURSE PRACTITIONER

## 2021-06-15 PROCEDURE — 3008F BODY MASS INDEX DOCD: CPT | Performed by: PHYSICIAN ASSISTANT

## 2021-06-15 PROCEDURE — 3008F BODY MASS INDEX DOCD: CPT | Performed by: NURSE PRACTITIONER

## 2021-06-15 RX ORDER — VALACYCLOVIR HYDROCHLORIDE 1 G/1
1000 TABLET, FILM COATED ORAL 2 TIMES DAILY
COMMUNITY
End: 2022-01-03 | Stop reason: SDUPTHER

## 2021-06-15 RX ORDER — DESOGESTREL AND ETHINYL ESTRADIOL 0.15-0.03
1 KIT ORAL DAILY
Qty: 84 TABLET | Refills: 0 | Status: SHIPPED | OUTPATIENT
Start: 2021-06-15 | End: 2021-09-02

## 2021-06-15 NOTE — PROGRESS NOTES
Assessment / Plan    1  Encounter to establish care    2  Visit for oral contraceptive prescription  3 month supply of OCP sent to patient's pharmacy  RV 1-2 months for yearly/pap    - desogestrel-ethinyl estradiol (Isibloom) 0 15-30 MG-MCG per tablet; Take 1 tablet by mouth daily  Dispense: 84 tablet; Refill: 0        Subjective      Erica Hdez is a 23 y o  female who presents for her annual gynecologic exam   NEW PATIENT    24 yo G0 presents to establish GYN care  She needs refills of her birth control pill  States that she came here directly from work and prefers to defer a full yearly until a time when she can shower beforehand  She has no complaints  Using Isibloom for OCP    Last pap:  2018 pap/hpv  STD screenin2018  Gardasil vaccine: 2 of 3    Periods are regular  Current contraception: OCP (estrogen/progesterone)  History of abnormal Pap smear: no  Family history of breast,uterine, ovarian or colon cancer: yes - PGM breast ca    Menstrual History:  OB History        0    Para   0    Term   0       0    AB   0    Living   0       SAB   0    TAB   0    Ectopic   0    Multiple   0    Live Births   0           Obstetric Comments   Menarche 17                Patient's last menstrual period was 2021 (approximate)  Period Cycle (Days):  (on OCP for birth control; h/o regular periods before)  Period Pattern: Regular    The following portions of the patient's history were reviewed and updated as appropriate: allergies, current medications, past family history, past medical history, past social history, past surgical history and problem list     Review of Systems      Review of Systems   Constitutional: Negative for chills and fever  Respiratory: Negative for cough and shortness of breath  Gastrointestinal: Negative for abdominal distention, abdominal pain, blood in stool, constipation, diarrhea, nausea and vomiting     Genitourinary: Negative for difficulty urinating, dysuria, frequency, genital sores, hematuria, menstrual problem, pelvic pain, urgency, vaginal bleeding and vaginal discharge  Musculoskeletal: Negative for arthralgias and myalgias  Breasts:  Negative for skin changes, dimpling, asymmetry, nipple discharge, redness, tenderness or palpable masses    Objective      /64 (BP Location: Left arm, Patient Position: Sitting, Cuff Size: Standard)   Ht 5' 6" (1 676 m)   Wt 69 kg (152 lb 3 2 oz)   LMP 05/25/2021 (Approximate)   BMI 24 57 kg/m²   Physical Exam  Constitutional:       General: She is not in acute distress  Appearance: Normal appearance  She is normal weight  She is not ill-appearing or diaphoretic  HENT:      Head: Normocephalic and atraumatic  Eyes:      Pupils: Pupils are equal, round, and reactive to light  Pulmonary:      Effort: Pulmonary effort is normal    Skin:     General: Skin is warm and dry  Neurological:      General: No focal deficit present  Mental Status: She is alert and oriented to person, place, and time  Psychiatric:         Mood and Affect: Mood normal          Behavior: Behavior normal          Thought Content:  Thought content normal          Judgment: Judgment normal

## 2021-08-25 ENCOUNTER — HOSPITAL ENCOUNTER (OUTPATIENT)
Dept: NON INVASIVE DIAGNOSTICS | Facility: CLINIC | Age: 26
Discharge: HOME | End: 2021-08-25
Payer: COMMERCIAL

## 2021-08-25 DIAGNOSIS — I87.1 NUTCRACKER PHENOMENON OF RENAL VEIN: ICD-10-CM

## 2021-08-25 PROCEDURE — 93975 VASCULAR STUDY: CPT | Performed by: SURGERY

## 2021-08-25 PROCEDURE — 93975 VASCULAR STUDY: CPT

## 2021-09-02 ENCOUNTER — ANNUAL EXAM (OUTPATIENT)
Dept: OBGYN CLINIC | Facility: CLINIC | Age: 26
End: 2021-09-02
Payer: COMMERCIAL

## 2021-09-02 VITALS
HEIGHT: 66 IN | SYSTOLIC BLOOD PRESSURE: 114 MMHG | HEART RATE: 80 BPM | DIASTOLIC BLOOD PRESSURE: 62 MMHG | WEIGHT: 154.2 LBS | BODY MASS INDEX: 24.78 KG/M2 | OXYGEN SATURATION: 96 %

## 2021-09-02 DIAGNOSIS — Z72.3 INADEQUATE EXERCISE: ICD-10-CM

## 2021-09-02 DIAGNOSIS — Z12.4 PAP SMEAR FOR CERVICAL CANCER SCREENING: ICD-10-CM

## 2021-09-02 DIAGNOSIS — Z30.41 ORAL CONTRACEPTIVE PILL SURVEILLANCE: ICD-10-CM

## 2021-09-02 DIAGNOSIS — Z01.419 ENCOUNTER FOR ANNUAL ROUTINE GYNECOLOGICAL EXAMINATION: Primary | ICD-10-CM

## 2021-09-02 DIAGNOSIS — E58 DIETARY CALCIUM DEFICIENCY: ICD-10-CM

## 2021-09-02 DIAGNOSIS — Z20.2 POSSIBLE EXPOSURE TO STD: ICD-10-CM

## 2021-09-02 PROCEDURE — G0145 SCR C/V CYTO,THINLAYER,RESCR: HCPCS | Performed by: OBSTETRICS & GYNECOLOGY

## 2021-09-02 PROCEDURE — 87491 CHLMYD TRACH DNA AMP PROBE: CPT | Performed by: OBSTETRICS & GYNECOLOGY

## 2021-09-02 PROCEDURE — 87591 N.GONORRHOEAE DNA AMP PROB: CPT | Performed by: OBSTETRICS & GYNECOLOGY

## 2021-09-02 PROCEDURE — S0612 ANNUAL GYNECOLOGICAL EXAMINA: HCPCS | Performed by: OBSTETRICS & GYNECOLOGY

## 2021-09-02 RX ORDER — LEVONORGESTREL AND ETHINYL ESTRADIOL 0.15-0.03
1 KIT ORAL DAILY
Qty: 91 TABLET | Refills: 3 | Status: SHIPPED | OUTPATIENT
Start: 2021-09-02

## 2021-09-02 NOTE — PROGRESS NOTES
Pt is a 27 y o  Kaylie Spencer with Patient's last menstrual period was 2021 (approximate)  using [facility] for [facility] presents for preventive care  She notes the same partner since her last STI evaluation  In her lifetime she has been involved with >5 partners   Safe sexual practices (monogomy, condoms) are not followed consistently  · She does  feel safe in the relationship  She does feel safe in her home  · Her calcium intake encompasses milk (cow, goat, almond, cashew, soy, etc) and cheese for a total of 2-3 servings daily on average  She does not take additional Vitamin D (MVI or supplement)  · She exercises 0 times per week  · Her menses occur every 28  Days, last 4-5 days and require super tampons every 7-8 hours  Menstrual History:  OB History        0    Para   0    Term   0       0    AB   0    Living   0       SAB   0    TAB   0    Ectopic   0    Multiple   0    Live Births   0           Obstetric Comments   Menarche 17    Menses: 28/5/super tampon every 8 hours              Menarche age: 17  Patient's last menstrual period was 2021 (approximate)  Period Cycle (Days): 21  Period Duration (Days): 5  Period Pattern: Regular  Menstrual Flow: Moderate  Menstrual Control: Tampon  ·      · She has begun but not completed the HPV vaccine series  · tobacco use : does not use tobacco              · Colonoscopy/mammogram: not indicated  · Pap: 2018-wnl, repeat collected today  · Ch/eduardo screening: desires  · Pt desires to trial a 3 month pill so she can have less menses       Past Medical History:   Diagnosis Date    Duodenum, 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)     Herpes     PERIORAL    Need for HPV vaccine     2/3 vaccines    Varicella vaccine        Past Surgical History:   Procedure Laterality Date    TOOTH EXTRACTION      UPPER GASTROINTESTINAL ENDOSCOPY         OB History    Para Term  AB Living   0 0 0 0 0 0   SAB TAB Ectopic Multiple Live Births   0 0 0 0 0   Obstetric Comments   Menarche 15      Menses: 28/5/super tampon every 8 hours            Current Outpatient Medications:     valACYclovir (VALTREX) 1,000 mg tablet, Take 1,000 mg by mouth 2 (two) times a day, Disp: , Rfl:     levonorgestrel-ethinyl estradiol (SEASONALE) 0 15-0 03 MG per tablet, Take 1 tablet by mouth daily, Disp: 91 tablet, Rfl: 3    Allergies   Allergen Reactions    Pollen Extract        Social History     Socioeconomic History    Marital status: Single     Spouse name: None    Number of children: 0    Years of education: some college    Highest education level: High school graduate   Occupational History    Occupation: works at [employer] Use    Smoking status: Former Smoker     Packs/day: 0 25     Types: Cigarettes     Quit date: 2018     Years since quitting: 3 6    Smokeless tobacco: Never Used   Vaping Use    Vaping Use: Every day    Substances: Nicotine, THC, Flavoring   Substance and Sexual Activity    Alcohol use: Yes     Alcohol/week: 1 0 - 2 0 standard drinks     Types: 1 - 2 Shots of liquor per week     Comment: social    Drug use: Yes     Types: Marijuana    Sexual activity: Yes     Partners: Male     Birth control/protection: OCP     Comment: lifetime partners: 7; current partner: 2019   Other Topics Concern    None   Social History Narrative    Shinto: no preference    Accepts blood products            Exercise: none    Calcium: 1 c milk daily, 1 cheese daily     Social Determinants of Health     Financial Resource Strain: Low Risk     Difficulty of Paying Living Expenses: Not hard at all   Food Insecurity: No Food Insecurity    Worried About Running Out of Food in the Last Year: Never true    [place] of Food in the Last Year: Never true   Transportation Needs: No Transportation Needs    Lack of Transportation (Medical): No    Lack of Transportation (Non-Medical):  No   Physical Activity: Inactive    Days of Exercise per Week: 0 days    Minutes of Exercise per Session: 0 min   Stress: Stress Concern Present    Feeling of Stress : Very much   Social Connections: Socially Isolated    Frequency of Communication with Friends and Family: Once a week    Frequency of Social Gatherings with Friends and Family: Once a week    Attends Islam Services: Never    Active Member of Clubs or Organizations: No    Attends Club or Organization Meetings: Never    Marital Status: Never    Intimate Partner Violence: Not At Risk    Fear of Current or Ex-Partner: No    Emotionally Abused: No    Physically Abused: No    Sexually Abused: No       Family History   Problem Relation Age of Onset    Hypothyroidism Mother     No Known Problems Father     No Known Problems Brother     Kidney cancer Maternal Grandmother     Lymphoma Maternal Grandfather     Breast cancer Paternal Grandmother         in remission    Lung cancer Paternal Grandfather     Alpha-1 antitrypsin deficiency Paternal Aunt     Colon cancer Neg Hx     Ovarian cancer Neg Hx     Uterine cancer Neg Hx        Blood pressure 114/62, pulse 80, height 5' 6" (1 676 m), weight 69 9 kg (154 lb 3 2 oz), last menstrual period 08/08/2021, SpO2 96 %, not currently breastfeeding  and Body mass index is 24 89 kg/m²  Physical Exam  Constitutional:       General: She is not in acute distress  Appearance: Normal appearance  She is well-developed and normal weight  She is not ill-appearing  HENT:      Head: Normocephalic and atraumatic  Eyes:      Extraocular Movements: Extraocular movements intact  Conjunctiva/sclera: Conjunctivae normal    Neck:      Thyroid: No thyromegaly  Trachea: No tracheal deviation  Cardiovascular:      Rate and Rhythm: Normal rate and regular rhythm  Heart sounds: Normal heart sounds  Pulmonary:      Effort: Pulmonary effort is normal  No respiratory distress  Breath sounds: Normal breath sounds  No stridor   No wheezing or rales    Abdominal:      General: Bowel sounds are normal  There is no distension  Palpations: Abdomen is soft  There is no mass  Tenderness: There is no abdominal tenderness  There is no guarding or rebound  Hernia: No hernia is present  Musculoskeletal:         General: No tenderness  Normal range of motion  Cervical back: Normal range of motion and neck supple  Lymphadenopathy:      Cervical: No cervical adenopathy  Skin:     General: Skin is warm  Findings: No erythema or rash  Neurological:      Mental Status: She is alert and oriented to person, place, and time  Psychiatric:         Mood and Affect: Mood normal          Behavior: Behavior normal          Thought Content: Thought content normal          Judgment: Judgment normal          Breasts: breasts appear normal, no suspicious masses, no skin or nipple changes or axillary nodes, symmetric fibrous changes in both upper outer quadrants  vulva: normal external genitalia for age and no lesions, masses, epithelial changes, or exudate  vagina: color pink and rugae  well formed rugae  cervix: nullip, no lesions  and pap and cultures obtained  uterus: NSSC, AF, NT, mobile  adnexa: no masses or tenderness      A/P:  Pt is a 27 y o  [facility] with      Diagnoses and all orders for this visit:    Encounter for annual routine gynecological examination    Pap smear for cervical cancer screening  -     Liquid-based pap, screening    Possible exposure to STD  -     Chlamydia/GC amplified DNA by PCR    Oral contraceptive pill surveillance  -     levonorgestrel-ethinyl estradiol (SEASONALE) 0 15-0 03 MG per tablet; Take 1 tablet by mouth daily    Dietary calcium deficiency  Patient advised recommendation of daily dietary calcium of 1000 mg calcium  Inadequate exercise  Patient advised recommendation of exercise 5 times per week for 30 minutes

## 2021-09-03 LAB
C TRACH DNA SPEC QL NAA+PROBE: POSITIVE
N GONORRHOEA DNA SPEC QL NAA+PROBE: NEGATIVE

## 2021-09-08 LAB
LAB AP GYN PRIMARY INTERPRETATION: NORMAL
LAB AP LMP: NORMAL
Lab: NORMAL

## 2021-10-05 ENCOUNTER — OFFICE VISIT (OUTPATIENT)
Dept: VASCULAR SURGERY | Facility: CLINIC | Age: 26
End: 2021-10-05
Payer: COMMERCIAL

## 2021-10-05 VITALS
HEIGHT: 66 IN | WEIGHT: 150.8 LBS | DIASTOLIC BLOOD PRESSURE: 68 MMHG | SYSTOLIC BLOOD PRESSURE: 102 MMHG | BODY MASS INDEX: 24.23 KG/M2 | HEART RATE: 82 BPM | TEMPERATURE: 98.3 F

## 2021-10-05 DIAGNOSIS — I87.1 NUTCRACKER PHENOMENON OF RENAL VEIN: ICD-10-CM

## 2021-10-05 DIAGNOSIS — K55.1 SMAS (SUPERIOR MESENTERIC ARTERY SYNDROME) (HCC): Primary | ICD-10-CM

## 2021-10-05 PROCEDURE — 99212 OFFICE O/P EST SF 10 MIN: CPT | Performed by: SURGERY

## 2021-10-05 PROCEDURE — 3008F BODY MASS INDEX DOCD: CPT | Performed by: SURGERY

## 2021-10-05 PROCEDURE — 1036F TOBACCO NON-USER: CPT | Performed by: SURGERY

## 2022-01-03 ENCOUNTER — TELEPHONE (OUTPATIENT)
Dept: INTERNAL MEDICINE CLINIC | Facility: CLINIC | Age: 27
End: 2022-01-03

## 2022-01-03 NOTE — TELEPHONE ENCOUNTER
----- Message from Wagner Rolon sent at 1/3/2022  8:13 AM EST -----  Regarding: Valtrax  Hii,   I was trying refill my Valtrax but couldnt find it in my pharmacy [facility]  I dont know if it ran out or what happened, but if it did can you please refill it for me     Thank you!  -Magali Tovar

## 2022-01-03 NOTE — TELEPHONE ENCOUNTER
I believe it is because it could have been on old prescription and also it is in med list but listed as historical provider, so we may not have filled it for her through this office  I did refill it for her 2,000mg twice a day for 1 day to treat cold sore  I have a few refills on the prescription as well if she needs it

## 2022-09-13 ENCOUNTER — OFFICE VISIT (OUTPATIENT)
Dept: OBGYN CLINIC | Facility: CLINIC | Age: 27
End: 2022-09-13
Payer: COMMERCIAL

## 2022-09-13 VITALS
WEIGHT: 146.2 LBS | DIASTOLIC BLOOD PRESSURE: 70 MMHG | BODY MASS INDEX: 23.5 KG/M2 | SYSTOLIC BLOOD PRESSURE: 106 MMHG | HEIGHT: 66 IN

## 2022-09-13 DIAGNOSIS — Z01.419 ENCOUNTER FOR GYNECOLOGICAL EXAMINATION (GENERAL) (ROUTINE) WITHOUT ABNORMAL FINDINGS: Primary | ICD-10-CM

## 2022-09-13 DIAGNOSIS — Z30.41 ORAL CONTRACEPTIVE PILL SURVEILLANCE: ICD-10-CM

## 2022-09-13 DIAGNOSIS — Z11.3 SCREEN FOR STD (SEXUALLY TRANSMITTED DISEASE): ICD-10-CM

## 2022-09-13 DIAGNOSIS — Z86.19 HISTORY OF CHLAMYDIA: ICD-10-CM

## 2022-09-13 DIAGNOSIS — Z12.4 SCREENING FOR CERVICAL CANCER: ICD-10-CM

## 2022-09-13 PROCEDURE — 87591 N.GONORRHOEAE DNA AMP PROB: CPT | Performed by: PHYSICIAN ASSISTANT

## 2022-09-13 PROCEDURE — 0503F POSTPARTUM CARE VISIT: CPT | Performed by: PHYSICIAN ASSISTANT

## 2022-09-13 PROCEDURE — G0145 SCR C/V CYTO,THINLAYER,RESCR: HCPCS | Performed by: PHYSICIAN ASSISTANT

## 2022-09-13 PROCEDURE — 87491 CHLMYD TRACH DNA AMP PROBE: CPT | Performed by: PHYSICIAN ASSISTANT

## 2022-09-13 PROCEDURE — 99395 PREV VISIT EST AGE 18-39: CPT | Performed by: PHYSICIAN ASSISTANT

## 2022-09-13 RX ORDER — LEVONORGESTREL AND ETHINYL ESTRADIOL 0.15-0.03
1 KIT ORAL DAILY
Qty: 91 TABLET | Refills: 3 | Status: SHIPPED | OUTPATIENT
Start: 2022-09-13

## 2022-09-13 NOTE — PROGRESS NOTES
ASSESSMENT & PLAN: Ayla Vu is a 24 y o  Cheryl Brine with normal gynecologic exam     1   Routine well woman exam done today  2  Pap and HPV:  The patient's last pap was 18, 21  It was normal     Pap was done today  Current ASCCP Guidelines reviewed  If normal PAP this year, can consider repeat screening in    3   STD testing  was done secondary to hx of chlamydia last year, never had f/u GABRIELLE  4   Doing well with OCP seasonale, refilled today  5   The following were reviewed in today's visit: STD testing and condom use STD prevention, compliance with OCP  6  Hx of cold sores, about 1 outbreak every 2-3 months  Continue valtrex at  Onset of sxs prn  RTO in 1 year for annual sooner if any problems arise  CC:  Annual Gynecologic Examination    HPI: Ayla Vu is a 24 y o  Cheryl Brine who presents for annual gynecologic examination  She has the following concerns:  Hx of CT 2021, completed azithromycin last year, no GABRIELLE  Denies vaginal sxs or pelvic pain  Hx of cold sores, last was 2-3months ago  Has valtrex refills prn  She graduated [employer] and is currently working at [employer] in [employer]  She enjoys her job  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  MH doing well  No LMP recorded (lmp unknown)  She is doing well on OCP overall  Getting period every 3 months  Menses frequency: q 3 months w/ placebo  Length of bleedin days  Bleeding quality:average  Sxs with menses:mild cramping  Denies intermenstrual bleeidng       Health Maintenance:      She does perform regular monthly self breast exams  Denies breast pain, lumps, nipple discharge or skin changes  She feels safe at home  Currently not in a relationship      Past Medical History:   Diagnosis Date    Chlamydia 2021    Rx sent for treatment    Duodenum, 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 ([facility] [address] )     Herpes     PERIORAL    Need for HPV vaccine 2/3 vaccines    Varicella vaccine        Past Surgical History:   Procedure Laterality Date    TOOTH EXTRACTION      UPPER GASTROINTESTINAL ENDOSCOPY         OB/Gyn History:    Pt does not have menstrual issues  History of sexually transmitted infection: Yes - as in HPI  History of abnormal pap smears: No      Patient is not currently sexually active  The current method of family planning is OCP (estrogen/progesterone)  OB History        0    Para   0    Term   0       0    AB   0    Living   0       SAB   0    IAB   0    Ectopic   0    Multiple   0    Live Births   0           Obstetric Comments   Menarche 17    Menses: 28/5/super tampon every 8 hours               Family History   Problem Relation Age of Onset    Hypothyroidism Mother     No Known Problems Father     No Known Problems Brother     Kidney cancer Maternal Grandmother     Lymphoma Maternal Grandfather     Breast cancer Paternal Grandmother         in remission    Lung cancer Paternal Grandfather     Alpha-1 antitrypsin deficiency Paternal [de-identified]     Colon cancer Neg Hx     Ovarian cancer Neg Hx     Uterine cancer Neg Hx        Family history of Breast/Uterine/Ovarian/Colon Cancer: as above    Social History:  Social History     Socioeconomic History    Marital status: Single     Spouse name: Not on file    Number of children: 0    Years of education: some college    Highest education level: High school graduate   Occupational History    Occupation: works at [employer] Use    Smoking status: Former Smoker     Packs/day: 0 25     Types: Cigarettes     Quit date:      Years since quittin 7    Smokeless tobacco: Never Used   Vaping Use    Vaping Use: Every day    Substances: Nicotine, THC, CBD, Flavoring   Substance and Sexual Activity    Alcohol use:  Yes     Alcohol/week: 1 0 - 2 0 standard drink     Types: 1 - 2 Shots of liquor per week     Comment: social    Drug use: Yes     Types: Marijuana    Sexual activity: Not Currently     Partners: Male     Birth control/protection: OCP     Comment: lifetime partners: 7; current partner: 2019   Other Topics Concern    Not on file   Social History Narrative    Zoroastrian: no preference    Accepts blood products            Exercise: none    Calcium: 1 c milk daily, 1 cheese daily     Social Determinants of Health     Financial Resource Strain: Not on file   Food Insecurity: Not on file   Transportation Needs: Not on file   Physical Activity: Not on file   Stress: Not on file   Social Connections: Not on file   Intimate Partner Violence: Not on file   Housing Stability: Not on file         Allergies   Allergen Reactions    Pollen Extract          Current Outpatient Medications:     levonorgestrel-ethinyl estradiol (SEASONALE) 0 15-0 03 MG per tablet, Take 1 tablet by mouth daily, Disp: 91 tablet, Rfl: 3    valACYclovir (VALTREX) 1,000 mg tablet, Take 2 tablets (2,000 mg total) by mouth 2 (two) times a day for 1 day, Disp: 4 tablet, Rfl: 3    Review of Systems:  Constitutional :no fever, feels well, no tiredness, no recent weight gain or loss  ENT: no ear ache, no loss of hearing, no nosebleeds or nasal discharge, no sore throat or hoarseness  Cardiovascular: no complaints of slow or fast heart beat, no chest pain, no palpitations, no leg claudication or lower extremity edema  Respiratory: no complaints of shortness of shortness of breath, no ENGLISH  Breasts:no complaints of breast pain, breast lump, or nipple discharge  Gastrointestinal: no complaints of abdominal pain, constipation, nausea, vomiting, or diarrhea or bloody stools  Genitourinary : no complaints of dysuria, incontinence, pelvic pain, no dysmenorrhea, vaginal discharge or abnormal vaginal bleeding and as noted in HPI  Musculoskeletal: no complaints of arthralgia, no myalgia, no joint swelling or stiffness, no limb pain or swelling    Integumentary: no complaints of skin rash or lesion, itching or dry skin  Neurological: no complaints of headache, no confusion, no numbness or tingling, no dizziness or fainting  Mental Health: no worsening anxiety, depression, SI    Objective      /70 (BP Location: Left arm, Patient Position: Sitting, Cuff Size: Standard)   Ht 5' 6" (1 676 m)   Wt 66 3 kg (146 lb 3 2 oz)   LMP  (LMP Unknown)   BMI 23 60 kg/m²     General:   appears stated age, cooperative, alert normal mood and affect  BMI 23 60   Neck: normal, supple,trachea midline, no masses  Thyroid palpated normal     Heart: regular rate and rhythm, S1, S2 normal, no murmur, click, rub or gallop   Lungs: clear to auscultation bilaterally   Breasts: normal appearance, no masses or tenderness, No nipple retraction or dimpling, No nipple discharge or bleeding, No axillary or supraclavicular adenopathy, Normal to palpation without dominant masses   Abdomen: soft, non-tender, without masses or organomegaly   Vulva: normal female genitalia, no lesions   Vagina: normal vagina, no discharge, exudate, lesion, or erythema   Urethra: normal   Cervix: Normal, no discharge  PAP done  Nontender  Uterus: normal   Adnexa: no mass, fullness, tenderness   Lymphatic palpation of lymph nodes in neck, axilla, groin and/or other locations: no lymphadenopathy or masses noted   Skin normal skin turgor and no rashes     Psychiatric orientation to person, place, and time: normal  mood and affect: normal

## 2022-09-14 LAB
C TRACH DNA SPEC QL NAA+PROBE: NEGATIVE
N GONORRHOEA DNA SPEC QL NAA+PROBE: NEGATIVE

## 2022-09-20 LAB
LAB AP GYN PRIMARY INTERPRETATION: NORMAL
Lab: NORMAL

## 2023-02-02 ENCOUNTER — OFFICE VISIT (OUTPATIENT)
Dept: OBGYN CLINIC | Facility: CLINIC | Age: 28
End: 2023-02-02

## 2023-02-02 VITALS
DIASTOLIC BLOOD PRESSURE: 84 MMHG | BODY MASS INDEX: 24.27 KG/M2 | WEIGHT: 151 LBS | HEIGHT: 66 IN | SYSTOLIC BLOOD PRESSURE: 120 MMHG

## 2023-02-02 DIAGNOSIS — Z30.09 ENCOUNTER FOR OTHER GENERAL COUNSELING AND ADVICE ON CONTRACEPTION: ICD-10-CM

## 2023-02-02 DIAGNOSIS — N92.1 BREAKTHROUGH BLEEDING ON BIRTH CONTROL PILLS: Primary | ICD-10-CM

## 2023-02-02 DIAGNOSIS — B00.1 RECURRENT COLD SORES: ICD-10-CM

## 2023-02-02 RX ORDER — VALACYCLOVIR HYDROCHLORIDE 1 G/1
2000 TABLET, FILM COATED ORAL 2 TIMES DAILY
Qty: 4 TABLET | Refills: 5 | Status: SHIPPED | OUTPATIENT
Start: 2023-02-02 | End: 2023-02-03

## 2023-02-02 NOTE — PROGRESS NOTES
Assessment/Plan:       Diagnoses and all orders for this visit:    Breakthrough bleeding on birth control pills    Encounter for other general counseling and advice on contraception    Recurrent cold sores  -     valACYclovir (VALTREX) 1,000 mg tablet; Take 2 tablets (2,000 mg total) by mouth 2 (two) times a day for 1 day      80-year-old female presenting today to discuss episode of breakthrough light bleeding/spotting starting in the middle of her OCP and has been persistent for the past 2 to 3 weeks  Using Seasonal and reports compliance, first time this is happened  Increased stress, increase in frequency of cold sore outbreaks    Patient's thyroid blood work 2021 was normal  She has no other symptoms or concerns  Patient contemplating discontinuing birth control altogether for a little since she is not sexually active    Discussed other contraception options including switching to a monophasic OCP such as Sherrlyn  so she gets menses once a month  She was on Isibloom in the past and did very well with it but was switched as she desired less frequent menses  Also discussed other option such as patch or ring  Patient had increased depression with Depo-Provera and does not want to consider implant or IUD at this time  Other option is to discontinue birth control altogether for a few months and let her body reset  Patient undecided at this time, will consider options and let me know  For now she is agreeable to continue OCP and monitor bleeding and any symptoms  She will make decision prior to finishing her last pack in the 91-day cycle  Valtrex refilled for cold sores  She will be due for her next annual in September  Patient states unfortunately she will be losing her insurance after her 34 birthday in August  Considering sooner annual but explained to her that the insurance may not cover since it has not been a full year  She will call insurance and look into this    Can consider utilizing [facility] if she is unable to get insurance  Chief Complaint   Patient presents with   • Contraception     Pt wants to get off BC pill due to hormones, also been spotting for 2-3 weeks  Subjective:      Patient ID: Alejandra Madrigal is a 24 y o  female     28y/o F presenting today to discuss first episode of breakthrough bleeding on seasonale, and recommendations to d/c [facility] or switch to different  Currently mild flow-spotting x 2-3 weeks now  First time this has happened w/ OCP  generally gets her period q3 months  She is currently in 2nd pack  States she didn't miss a pill or take it late  She was also a little more emotional during this time as well, pre-existing depression which had been stable  No SI   She has also noticed a recent frequent outbreak of cold sores  She has not bee sexually active > 1 yr  No vaginal or urinary sxs sxs  She is having some mild pelvic cramping intermittent throughout the past 2-3 weeks  Feels she has been a little more randomly crampy, however, the past few motnhs  The following portions of the patient's history were reviewed and updated as appropriate: allergies, current medications, past medical history, past social history and problem list     Review of Systems   Constitutional: Negative  Gastrointestinal: Negative  Endocrine: Negative  Genitourinary:        As in HPI   Neurological: Negative  Psychiatric/Behavioral:        Increase in moodiness during time of intermenstrual spotting         Objective:      /84 (BP Location: Left arm, Patient Position: Sitting, Cuff Size: Adult)   Ht 5' 6" (1 676 m)   Wt 68 5 kg (151 lb)   LMP 01/13/2023 (Approximate)   BMI 24 37 kg/m²          Physical Exam  Vitals reviewed  Constitutional:       Appearance: Normal appearance  Cardiovascular:      Rate and Rhythm: Normal rate and regular rhythm  Heart sounds: Normal heart sounds     Pulmonary:      Effort: Pulmonary effort is normal  Breath sounds: Normal breath sounds  Musculoskeletal:      Cervical back: Neck supple  Neurological:      Mental Status: She is alert and oriented to person, place, and time  Psychiatric:         Mood and Affect: Mood normal          Behavior: Behavior normal  Behavior is cooperative

## 2023-02-27 ENCOUNTER — OFFICE VISIT (OUTPATIENT)
Dept: FAMILY MEDICINE CLINIC | Facility: CLINIC | Age: 28
End: 2023-02-27

## 2023-02-27 VITALS
HEART RATE: 95 BPM | BODY MASS INDEX: 23.28 KG/M2 | HEIGHT: 68 IN | SYSTOLIC BLOOD PRESSURE: 112 MMHG | OXYGEN SATURATION: 97 % | TEMPERATURE: 98.5 F | DIASTOLIC BLOOD PRESSURE: 72 MMHG | WEIGHT: 153.6 LBS

## 2023-02-27 DIAGNOSIS — R55 SYNCOPE, UNSPECIFIED SYNCOPE TYPE: Primary | ICD-10-CM

## 2023-02-27 NOTE — PROGRESS NOTES
[facility]    ASSESSMENT AND PLAN     1  Syncope, unspecified syncope type  Assessment & Plan:  Symptoms reviewed  Physical assessment unremarkable today  Discussed possible differentials of her recent (and remote) syncopal episodes  Will start work up today with labs  Assess cardiac with 48 hour Holter  Office EKG today : NSR with Sinus arrhythmia  BP today: 112/72  Advised to get home cuff to monitor her pressures - especially if another episode were to occur  Will refer today to cardiology - as family history uncertain (as in HPI)  Advised strict ER precautions should she experience another syncopal episode  Will have her f/u in office in 2 weeks to review lab work and testing  F U sooner with further problems/concerns  Orders:  -     CBC and differential; Future  -     Comprehensive metabolic panel; Future  -     Lipid panel; Future  -     TSH, 3rd generation with Free T4 reflex; Future  -     POCT ECG  -     Holter monitor; Future; Expected date: 02/27/2023  -     Magnesium; Future  -     T4, free; Future  -     Thyroid Antibodies Panel; Future  -     Ambulatory Referral to Cardiology; Future         SUBJECTIVE       Patient ID: Viraj Ocampo is a 24 y o  female  Chief Complaint   Patient presents with   • Establish Care   • Loss of Consciousness     Passed out 2/19/23, did not hit head       HISTORY OF PRESENT ILLNESS    Presents to establish care  Scheduled appt to discuss recent syncopal episode  Reports she has passed out before - last time was several years ago  But does report she has had several times that she felt  close to passing out, but was able to stop it by laying down  Reports she feels dizzy/lightheaded prior to it happening  The dizziness occurs randomly  She cannot identify any specific triggers  She does report though that she will occasionally feel dizzy with quick positions changes  She has not discussed these episodes with a provider before    This most recent episode: States she was sleeping in (regular day off)  Got up to go upstairs for dinner  Felt sudden fogginess in her head when she got to the kitchen  Sat down and that was the last she remembered  Reports she passed out, and her mom assisted her to lay back  Mom reported that she did not hit her head  She is unsure exactly  how long she was unconscious, but family report it was about 30-60 seconds  She reports her family noted her lips turned whitsh/blue  Family also  noted that she looked as if she were gasping for air  States she  woke spontaneously and vomited  Reports a little fogginess upon waking and after the vomit, but  That feeling resolved  within minutes  Family reported no shaking/tremors or seizure like movements while  unconscious  Reports she did urinate though during the episode - that was the first time she urinated during a "passing out" episode  Denies biting her tongue  Denies any chest pain or pressure  Reports getting palpations at times, but notes it only when she smokes too much marijuana  Reports she has a medical card - was given it for insomnia, anxiety and SMAS  Reports possible family history of POTS  Mom reports having had similar episodes when she was [facility] age  No cause was ever identified  Denies history of past or present headaches or migraines  Denies any extremity weakness, numbness or tingling  The following portions of the patient's history were reviewed and updated as appropriate: allergies, current medications, past family history, past medical history, past social history, past surgical history, and problem list     REVIEW OF SYSTEMS  Review of Systems   Constitutional: Negative for activity change, appetite change, fatigue and fever  HENT: Negative  Respiratory: Negative  Cardiovascular: Negative  Gastrointestinal: Negative  Genitourinary: Negative  Neurological: Positive for syncope (as in HPI)   Negative for dizziness, weakness and headaches  Psychiatric/Behavioral: Negative  OBJECTIVE      VITAL SIGNS  /72 (BP Location: Left arm, Patient Position: Sitting, Cuff Size: Adult)   Pulse 95   Temp 98 5 °F (36 9 °C)   Ht 5' 7 5" (1 715 m)   Wt 69 7 kg (153 lb 9 6 oz)   SpO2 97%   BMI 23 70 kg/m²     CURRENT MEDICATIONS    Current Outpatient Medications:   •  levonorgestrel-ethinyl estradiol (SEASONALE) 0 15-0 03 MG per tablet, Take 1 tablet by mouth daily, Disp: 91 tablet, Rfl: 3  •  valACYclovir (VALTREX) 1,000 mg tablet, Take 2 tablets (2,000 mg total) by mouth 2 (two) times a day for 1 day, Disp: 4 tablet, Rfl: 5      PHYSICAL EXAMINATION   Physical Exam  Vitals and nursing note reviewed  Constitutional:       General: She is not in acute distress  Appearance: Normal appearance  She is not ill-appearing  HENT:      Head: Normocephalic  Right Ear: Tympanic membrane and ear canal normal       Left Ear: Tympanic membrane and ear canal normal       Nose: Nose normal       Mouth/Throat:      Mouth: Mucous membranes are moist       Pharynx: Oropharynx is clear  Eyes:      Extraocular Movements: Extraocular movements intact  Conjunctiva/sclera: Conjunctivae normal       Pupils: Pupils are equal, round, and reactive to light  Neck:      Thyroid: No thyromegaly  Cardiovascular:      Rate and Rhythm: Normal rate and regular rhythm  Heart sounds: Normal heart sounds  Pulmonary:      Effort: Pulmonary effort is normal  No respiratory distress  Breath sounds: Normal breath sounds and air entry  Lymphadenopathy:      Cervical: No cervical adenopathy  Skin:     General: Skin is warm and dry  Neurological:      General: No focal deficit present  Mental Status: She is alert and oriented to person, place, and time  Psychiatric:         Attention and Perception: Attention normal          Mood and Affect: Mood normal          Behavior: Behavior normal          Thought Content:  Thought content normal          Judgment: Judgment normal

## 2023-02-28 ENCOUNTER — APPOINTMENT (OUTPATIENT)
Dept: LAB | Facility: CLINIC | Age: 28
End: 2023-02-28

## 2023-02-28 DIAGNOSIS — R55 SYNCOPE, UNSPECIFIED SYNCOPE TYPE: ICD-10-CM

## 2023-02-28 LAB
ALBUMIN SERPL BCP-MCNC: 3.8 G/DL (ref 3.5–5)
ALP SERPL-CCNC: 22 U/L (ref 46–116)
ALT SERPL W P-5'-P-CCNC: 33 U/L (ref 12–78)
ANION GAP SERPL CALCULATED.3IONS-SCNC: 3 MMOL/L (ref 4–13)
AST SERPL W P-5'-P-CCNC: 18 U/L (ref 5–45)
BASOPHILS # BLD AUTO: 0.05 THOUSANDS/ÂΜL (ref 0–0.1)
BASOPHILS NFR BLD AUTO: 1 % (ref 0–1)
BILIRUB SERPL-MCNC: 0.32 MG/DL (ref 0.2–1)
BUN SERPL-MCNC: 12 MG/DL (ref 5–25)
CALCIUM SERPL-MCNC: 9.3 MG/DL (ref 8.3–10.1)
CHLORIDE SERPL-SCNC: 106 MMOL/L (ref 96–108)
CHOLEST SERPL-MCNC: 246 MG/DL
CO2 SERPL-SCNC: 27 MMOL/L (ref 21–32)
CREAT SERPL-MCNC: 0.9 MG/DL (ref 0.6–1.3)
EOSINOPHIL # BLD AUTO: 0.99 THOUSAND/ÂΜL (ref 0–0.61)
EOSINOPHIL NFR BLD AUTO: 14 % (ref 0–6)
ERYTHROCYTE [DISTWIDTH] IN BLOOD BY AUTOMATED COUNT: 12 % (ref 11.6–15.1)
GFR SERPL CREATININE-BSD FRML MDRD: 89 ML/MIN/1.73SQ M
GLUCOSE P FAST SERPL-MCNC: 88 MG/DL (ref 65–99)
HCT VFR BLD AUTO: 42.2 % (ref 34.8–46.1)
HDLC SERPL-MCNC: 52 MG/DL
HGB BLD-MCNC: 13.8 G/DL (ref 11.5–15.4)
IMM GRANULOCYTES # BLD AUTO: 0.01 THOUSAND/UL (ref 0–0.2)
IMM GRANULOCYTES NFR BLD AUTO: 0 % (ref 0–2)
LDLC SERPL CALC-MCNC: 179 MG/DL (ref 0–100)
LYMPHOCYTES # BLD AUTO: 2.53 THOUSANDS/ÂΜL (ref 0.6–4.47)
LYMPHOCYTES NFR BLD AUTO: 37 % (ref 14–44)
MAGNESIUM SERPL-MCNC: 2.1 MG/DL (ref 1.6–2.6)
MCH RBC QN AUTO: 31.2 PG (ref 26.8–34.3)
MCHC RBC AUTO-ENTMCNC: 32.7 G/DL (ref 31.4–37.4)
MCV RBC AUTO: 95 FL (ref 82–98)
MONOCYTES # BLD AUTO: 0.53 THOUSAND/ÂΜL (ref 0.17–1.22)
MONOCYTES NFR BLD AUTO: 8 % (ref 4–12)
NEUTROPHILS # BLD AUTO: 2.8 THOUSANDS/ÂΜL (ref 1.85–7.62)
NEUTS SEG NFR BLD AUTO: 40 % (ref 43–75)
NONHDLC SERPL-MCNC: 194 MG/DL
NRBC BLD AUTO-RTO: 0 /100 WBCS
PLATELET # BLD AUTO: 263 THOUSANDS/UL (ref 149–390)
PMV BLD AUTO: 12.1 FL (ref 8.9–12.7)
POTASSIUM SERPL-SCNC: 4 MMOL/L (ref 3.5–5.3)
PROT SERPL-MCNC: 7.6 G/DL (ref 6.4–8.4)
RBC # BLD AUTO: 4.43 MILLION/UL (ref 3.81–5.12)
SODIUM SERPL-SCNC: 136 MMOL/L (ref 135–147)
T4 FREE SERPL-MCNC: 0.98 NG/DL (ref 0.76–1.46)
TRIGL SERPL-MCNC: 76 MG/DL
TSH SERPL DL<=0.05 MIU/L-ACNC: 1.89 UIU/ML (ref 0.45–4.5)
WBC # BLD AUTO: 6.91 THOUSAND/UL (ref 4.31–10.16)

## 2023-02-28 NOTE — ASSESSMENT & PLAN NOTE
Symptoms reviewed  Physical assessment unremarkable today  Discussed possible differentials of her recent (and remote) syncopal episodes  Will start work up today with labs  Assess cardiac with 48 hour Holter  Office EKG today : NSR with Sinus arrhythmia  BP today: 112/72  Advised to get home cuff to monitor her pressures - especially if another episode were to occur  Will refer today to cardiology - as family history uncertain (as in HPI)  Advised strict ER precautions should she experience another syncopal episode  Will have her f/u in office in 2 weeks to review lab work and testing  F U sooner with further problems/concerns

## 2023-03-01 DIAGNOSIS — R79.89 ABNORMAL THYROID BLOOD TEST: Primary | ICD-10-CM

## 2023-03-01 DIAGNOSIS — Z83.49 FAMILY HISTORY OF THYROID DISEASE: ICD-10-CM

## 2023-03-01 LAB
THYROGLOB AB SERPL-ACNC: 16.2 IU/ML (ref 0–0.9)
THYROPEROXIDASE AB SERPL-ACNC: <9 IU/ML (ref 0–34)

## 2023-03-17 ENCOUNTER — OFFICE VISIT (OUTPATIENT)
Dept: FAMILY MEDICINE CLINIC | Facility: CLINIC | Age: 28
End: 2023-03-17

## 2023-03-17 VITALS
DIASTOLIC BLOOD PRESSURE: 72 MMHG | SYSTOLIC BLOOD PRESSURE: 102 MMHG | WEIGHT: 154 LBS | TEMPERATURE: 97.9 F | OXYGEN SATURATION: 96 % | HEIGHT: 67 IN | BODY MASS INDEX: 24.17 KG/M2 | HEART RATE: 76 BPM

## 2023-03-17 DIAGNOSIS — E78.5 HYPERLIPIDEMIA, UNSPECIFIED HYPERLIPIDEMIA TYPE: ICD-10-CM

## 2023-03-17 DIAGNOSIS — Z13.1 SCREENING FOR DIABETES MELLITUS: ICD-10-CM

## 2023-03-17 DIAGNOSIS — H81.10 BENIGN PAROXYSMAL POSITIONAL VERTIGO, UNSPECIFIED LATERALITY: Primary | ICD-10-CM

## 2023-03-17 DIAGNOSIS — R79.89 ABNORMAL THYROID BLOOD TEST: ICD-10-CM

## 2023-03-17 DIAGNOSIS — B00.1 RECURRENT COLD SORES: ICD-10-CM

## 2023-03-17 RX ORDER — MECLIZINE HYDROCHLORIDE 25 MG/1
25 TABLET ORAL 3 TIMES DAILY PRN
COMMUNITY
Start: 2023-03-02 | End: 2024-03-01

## 2023-03-17 RX ORDER — VALACYCLOVIR HYDROCHLORIDE 1 G/1
2000 TABLET, FILM COATED ORAL 2 TIMES DAILY
Qty: 30 TABLET | Refills: 0 | Status: SHIPPED | OUTPATIENT
Start: 2023-03-17 | End: 2023-03-18

## 2023-03-17 NOTE — ASSESSMENT & PLAN NOTE
Abnormal Thyroglobulin Ab: 16 2  Was elevated 2021 as well: 13 2  TSH/T4 normal    Family hx: Mom Hypo s/p tx Graves  Referral placed for Endo evaluation   Pt reports with likely go to Endo her mom sees - she is unsure of name but believes they are [facility]

## 2023-03-17 NOTE — ASSESSMENT & PLAN NOTE
Discussed recent episode - dizziness (no syncope) that prompted another ER visit  Was diagnosed with BPPV and referred to vestibular therapy  She has this scheduled to start 3/30 but reports no further symptoms since Epley in ER   Would advise to continue with this current plan

## 2023-03-17 NOTE — PROGRESS NOTES
[facility]    ASSESSMENT AND PLAN     1  Benign paroxysmal positional vertigo, unspecified laterality  Assessment & Plan:  Discussed recent episode - dizziness (no syncope) that prompted another ER visit  Was diagnosed with BPPV and referred to vestibular therapy  She has this scheduled to start 3/30 but reports no further symptoms since Epley in ER  Would advise to continue with this current plan      2  Recurrent cold sores  Comments:  Valacyclovir refilled  Orders:  -     valACYclovir (VALTREX) 1,000 mg tablet; Take 2 tablets (2,000 mg total) by mouth 2 (two) times a day for 1 day    3  Hyperlipidemia, unspecified hyperlipidemia type  Assessment & Plan:  Lipids elevated: 246/76/52/179  Healthy lifestyle/diet reviewed  Will recheck 6 month    Orders:  -     Lipid panel; Future    4  Screening for diabetes mellitus  -     Comprehensive metabolic panel; Future    5  Abnormal thyroid blood test  Assessment & Plan:  Abnormal Thyroglobulin Ab: 16 2  Was elevated 2021 as well: 13 2  TSH/T4 normal    Family hx: Mom Hypo s/p tx Graves  Referral placed for Endo evaluation  Pt reports with likely go to Endo her mom sees - she is unsure of name but believes they are [facility]       Reviewed recent Holter results  Unremarkable  Referral to Cardiology to r/o cardiac cause for past syncopal episodes  SUBJECTIVE       Patient ID: Marie Garcia is a 24 y o  female  Chief Complaint   Patient presents with   • Follow-up       HISTORY OF PRESENT ILLNESS    Seen in ER last Thursday - excessive dizziness  No syncope  Epley Maneuver performed  Asympotmatic since  Was referred to PT for Vertigo  Scheduled    Completed lab work        The following portions of the patient's history were reviewed and updated as appropriate: allergies, current medications, past family history, past medical history, past social history, past surgical history, and problem list     REVIEW OF SYSTEMS  Review of Systems   Constitutional: Negative  Respiratory: Negative  Cardiovascular: Negative  Gastrointestinal: Negative  Genitourinary: Negative  Neurological: Negative  Psychiatric/Behavioral: Negative  OBJECTIVE      VITAL SIGNS  /72 (BP Location: Left arm, Patient Position: Sitting, Cuff Size: Standard)   Pulse 76   Temp 97 9 °F (36 6 °C)   Ht 5' 7" (1 702 m)   Wt 69 9 kg (154 lb)   SpO2 96%   BMI 24 12 kg/m²     CURRENT MEDICATIONS    Current Outpatient Medications:   •  levonorgestrel-ethinyl estradiol (SEASONALE) 0 15-0 03 MG per tablet, Take 1 tablet by mouth daily, Disp: 91 tablet, Rfl: 3  •  meclizine (ANTIVERT) 25 mg tablet, Take 25 mg by mouth Three times daily as needed, Disp: , Rfl:   •  valACYclovir (VALTREX) 1,000 mg tablet, Take 2 tablets (2,000 mg total) by mouth 2 (two) times a day for 1 day, Disp: 30 tablet, Rfl: 0      PHYSICAL EXAMINATION   Physical Exam  Vitals and nursing note reviewed  Constitutional:       General: She is not in acute distress  Appearance: Normal appearance  She is not ill-appearing  HENT:      Head: Normocephalic  Pulmonary:      Effort: Pulmonary effort is normal  No respiratory distress  Neurological:      Mental Status: She is alert and oriented to person, place, and time     Psychiatric:         Attention and Perception: Attention normal          Mood and Affect: Mood normal          Behavior: Behavior normal

## 2023-04-28 ENCOUNTER — APPOINTMENT (OUTPATIENT)
Dept: LAB | Facility: CLINIC | Age: 28
End: 2023-04-28

## 2023-04-28 DIAGNOSIS — R55 SYNCOPE AND COLLAPSE: ICD-10-CM

## 2023-04-28 DIAGNOSIS — R76.8 AUTOANTIBODY TITER ELEVATED: ICD-10-CM

## 2023-04-28 DIAGNOSIS — E55.9 VITAMIN D DEFICIENCY DISEASE: ICD-10-CM

## 2023-04-28 LAB
25(OH)D3 SERPL-MCNC: 28.9 NG/ML (ref 30–100)
ALBUMIN SERPL BCP-MCNC: 4.1 G/DL (ref 3.5–5)
ALP SERPL-CCNC: 11 U/L (ref 46–116)
ALT SERPL W P-5'-P-CCNC: 22 U/L (ref 12–78)
ANION GAP SERPL CALCULATED.3IONS-SCNC: 3 MMOL/L (ref 4–13)
AST SERPL W P-5'-P-CCNC: 17 U/L (ref 5–45)
BILIRUB SERPL-MCNC: 0.6 MG/DL (ref 0.2–1)
BUN SERPL-MCNC: 16 MG/DL (ref 5–25)
CALCIUM SERPL-MCNC: 9.4 MG/DL (ref 8.3–10.1)
CHLORIDE SERPL-SCNC: 107 MMOL/L (ref 96–108)
CO2 SERPL-SCNC: 27 MMOL/L (ref 21–32)
CORTIS SERPL-MCNC: 25.3 UG/DL
CREAT SERPL-MCNC: 0.86 MG/DL (ref 0.6–1.3)
GFR SERPL CREATININE-BSD FRML MDRD: 94 ML/MIN/1.73SQ M
GLUCOSE P FAST SERPL-MCNC: 83 MG/DL (ref 65–99)
POTASSIUM SERPL-SCNC: 4.1 MMOL/L (ref 3.5–5.3)
PROT SERPL-MCNC: 7.5 G/DL (ref 6.4–8.4)
SODIUM SERPL-SCNC: 137 MMOL/L (ref 135–147)
TSH SERPL DL<=0.05 MIU/L-ACNC: 1.89 UIU/ML (ref 0.45–4.5)

## 2023-04-29 LAB
C PEPTIDE SERPL-MCNC: 1.3 NG/ML (ref 1.1–4.4)
THYROPEROXIDASE AB SERPL-ACNC: <9 IU/ML (ref 0–34)

## 2023-04-30 LAB
ENDOMYSIUM IGA SER QL: NEGATIVE
GLIADIN PEPTIDE IGA SER-ACNC: 4 UNITS (ref 0–19)
GLIADIN PEPTIDE IGG SER-ACNC: 3 UNITS (ref 0–19)
IGA SERPL-MCNC: 150 MG/DL (ref 87–352)
TTG IGA SER-ACNC: <2 U/ML (ref 0–3)
TTG IGG SER-ACNC: 2 U/ML (ref 0–5)
VIT B12 SERPL-MCNC: 255 PG/ML (ref 100–900)

## 2023-05-01 LAB — ADRENAL AB TITR SER IF: NEGATIVE {TITER}

## 2023-05-02 LAB — ACTH PLAS-MCNC: 22.7 PG/ML (ref 7.2–63.3)

## 2023-05-15 ENCOUNTER — OFFICE VISIT (OUTPATIENT)
Dept: FAMILY MEDICINE CLINIC | Facility: CLINIC | Age: 28
End: 2023-05-15

## 2023-05-15 VITALS
TEMPERATURE: 97.9 F | WEIGHT: 149 LBS | DIASTOLIC BLOOD PRESSURE: 70 MMHG | BODY MASS INDEX: 23.39 KG/M2 | HEART RATE: 71 BPM | SYSTOLIC BLOOD PRESSURE: 110 MMHG | HEIGHT: 67 IN | OXYGEN SATURATION: 98 %

## 2023-05-15 DIAGNOSIS — R55 SYNCOPE, UNSPECIFIED SYNCOPE TYPE: Primary | ICD-10-CM

## 2023-05-15 DIAGNOSIS — Z13.1 SCREENING FOR DIABETES MELLITUS: ICD-10-CM

## 2023-05-15 DIAGNOSIS — E78.5 HYPERLIPIDEMIA, UNSPECIFIED HYPERLIPIDEMIA TYPE: ICD-10-CM

## 2023-05-15 PROBLEM — E78.00 ELEVATED LDL CHOLESTEROL LEVEL: Status: ACTIVE | Noted: 2023-05-15

## 2023-05-15 NOTE — PROGRESS NOTES
"[address]    ASSESSMENT AND PLAN     1  Syncope, unspecified syncope type  Assessment & Plan:  Follow up today  Doing well - no further episodes  Has followed up with [facility]iology (neurogenic syncope) and [facility] Endocrinology  Management advise was given  Reports 6 month follow up with both  Continue routine physical exams yearly  Otherwise follow up as needed  Immediate evaluation with further episodes  ER precaution with acute change    Orders:  -     Comprehensive metabolic panel; Future; Expected date: 11/15/2023    2  Screening for diabetes mellitus  -     Comprehensive metabolic panel; Future; Expected date: 11/15/2023    3  Hyperlipidemia, unspecified hyperlipidemia type  Assessment & Plan:  Lipids elevated: 246/76/52/179  Healthy lifestyle/diet reviewed again  Will recheck 6 month    Orders:  -     Lipid panel; Future; Expected date: 11/15/2023         SUBJECTIVE       Patient ID: Nubia Rivera is a 24 y o  female  Chief Complaint   Patient presents with   • Follow-up       HISTORY OF PRESENT ILLNESS    Here for follow up  Saw both Cardiology and Endocrinology  Work ups negative  Dx neurogenic syncope  Feels well  No further episodes since last seen        The following portions of the patient's history were reviewed and updated as appropriate: allergies, current medications, past family history, past medical history, past social history, past surgical history, and problem list     REVIEW OF SYSTEMS  Review of Systems   Constitutional: Negative  Watching her diet  Increasing water   Respiratory: Negative  Cardiovascular: Negative  Neurological: Negative for seizures, syncope, weakness and numbness  Dizziness: occasional    Psychiatric/Behavioral: Negative          OBJECTIVE      VITAL SIGNS  /70 (BP Location: Left arm, Patient Position: Sitting, Cuff Size: Large)   Pulse 71   Temp 97 9 °F (36 6 °C) (Tympanic)   Ht 5' 7\" (1 702 m)   Wt 67 6 kg (149 lb)   SpO2 98% " BMI 23 34 kg/m²     CURRENT MEDICATIONS    Current Outpatient Medications:   •  levonorgestrel-ethinyl estradiol (SEASONALE) 0 15-0 03 MG per tablet, Take 1 tablet by mouth daily, Disp: 91 tablet, Rfl: 3  •  meclizine (ANTIVERT) 25 mg tablet, Take 25 mg by mouth Three times daily as needed, Disp: , Rfl:   •  valACYclovir (VALTREX) 1,000 mg tablet, Take 2 tablets (2,000 mg total) by mouth 2 (two) times a day for 1 day, Disp: 30 tablet, Rfl: 0      PHYSICAL EXAMINATION   Physical Exam  Vitals and nursing note reviewed  Constitutional:       Appearance: Normal appearance  She is well-developed  She is not ill-appearing  HENT:      Head: Normocephalic  Pulmonary:      Effort: Pulmonary effort is normal  No respiratory distress  Neurological:      Mental Status: She is alert and oriented to person, place, and time     Psychiatric:         Attention and Perception: Attention normal          Mood and Affect: Mood normal          Behavior: Behavior normal

## 2023-05-15 NOTE — ASSESSMENT & PLAN NOTE
Follow up today  Doing well - no further episodes  Has followed up with [facility] Cardiology (neurogenic syncope) and [facility] Endocrinology  Management advise was given  Reports 6 month follow up with both  Continue routine physical exams yearly  Otherwise follow up as needed  Immediate evaluation with further episodes   ER precaution with acute change

## 2023-09-18 ENCOUNTER — ANNUAL EXAM (OUTPATIENT)
Dept: OBGYN CLINIC | Facility: CLINIC | Age: 28
End: 2023-09-18
Payer: COMMERCIAL

## 2023-09-18 VITALS
BODY MASS INDEX: 23.13 KG/M2 | SYSTOLIC BLOOD PRESSURE: 110 MMHG | WEIGHT: 147.4 LBS | HEIGHT: 67 IN | DIASTOLIC BLOOD PRESSURE: 68 MMHG

## 2023-09-18 DIAGNOSIS — Z01.419 ENCOUNTER FOR GYNECOLOGICAL EXAMINATION (GENERAL) (ROUTINE) WITHOUT ABNORMAL FINDINGS: Primary | ICD-10-CM

## 2023-09-18 PROCEDURE — S0612 ANNUAL GYNECOLOGICAL EXAMINA: HCPCS | Performed by: PHYSICIAN ASSISTANT

## 2023-09-18 RX ORDER — SODIUM FLUORIDE 6 MG/ML
PASTE, DENTIFRICE DENTAL
COMMUNITY
Start: 2023-06-28

## 2023-09-18 RX ORDER — DIPHENOXYLATE HYDROCHLORIDE AND ATROPINE SULFATE 2.5; .025 MG/1; MG/1
1 TABLET ORAL DAILY
COMMUNITY

## 2023-09-18 NOTE — PROGRESS NOTES
ASSESSMENT & PLAN: Basilia Rendon is a 34 y.o. Iraida Shamrock with normal gynecologic exam.    1.  Routine well woman exam done today  2. Pap and HPV:  The patient's last pap was  and . It was normal.    Pap was not done today. Current ASCCP Guidelines reviewed. 3.  STD testing from  negative and has not had any sexual encounters since that time. Will defer today. 4.  Gardasil series completed. 5. The following were reviewed in today's visit: breast self exam, STD testing, adequate intake of calcium and vitamin D, exercise, healthy diet, contraception choices and age-appropriate recommendations regarding screenings and prevention. 6.  Patient concerned about a slight increase in her vaginal discharge lately but without odor, abnormal color or any vulvovaginal symptoms. Reassurance given today, no culture performed since discharge appears normal and otherwise asymptomatic. Patient will monitor for any changes and notify us if any problems. RTO in 1 year for annual sooner if any problems arise in the interim. CC:  Annual Gynecologic Examination    HPI: Basilia Rendon is a 34 y.o. Iraida Shamrock who presents for annual gynecologic examination. She has the following concerns:  States she has noticed increased in [facility] discharge, more constant, not just during ovulation. No odor, denies vaginal itching, burning, pain or pelvic pain. Normal urination. She has not been sexually active in > 1 yr. She stopped OCP as she was no longer sexually active, stopped it about 5-6 months ago. Healthy diet Yes  Exercise Yes; active and on feet at work. Vitamins Yes; MVI    No LMP recorded (lmp unknown). No menstrual concerns. Menses frequency: regular once a month  Length of bleedin-6 days  Bleeding quality: average bleeding  Sxs with menses: minimal cramping. denies intermenstrual bleeding/spotting.      Health Maintenance:      She does perform irregular monthly self breast exams. Denies breast pain, lump, skin change or nipple discharge. She feels safe at home. 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 History of abnormal pap smears: No .    Patient is not currently sexually active. Plans condom and restart OCP if becomes sexually active again.      OB History        0    Para   0    Term   0       0    AB   0    Living   0       SAB   0    IAB   0    Ectopic   0    Multiple   0    Live Births   0           Obstetric Comments   Menarche 17    Menses: 28/5/super tampon every 8 hours               Family History   Problem Relation Age of Onset   • Hypothyroidism Mother    • No Known Problems Father    • No Known Problems Brother    • Kidney cancer Maternal Grandmother    • Lymphoma Maternal Grandfather    • Breast cancer Paternal Grandmother    • Lung cancer Paternal Grandfather    • Alpha-1 antitrypsin deficiency Paternal Aunt    • Colon cancer Neg Hx    • Ovarian cancer Neg Hx    • Uterine cancer Neg Hx        Family history of Breast/Uterine/Ovarian/Colon Cancer: PGM breast cancer    Social History:  Social History     Socioeconomic History   • Marital status: Single     Spouse name: Not on file   • Number of children: 0   • Years of education: some college   • Highest education level: High school graduate   Occupational History   • Occupation: works at [employer]   Tobacco Use   • Smoking status: Former     Packs/day: 0.25     Types: Cigarettes     Quit date:      Years since quittin.7   • Smokeless tobacco: Current • Tobacco comments:     Patient vapes   Vaping Use   • Vaping Use: Every day   • Substances: Nicotine, THC, CBD, Flavoring   Substance and Sexual Activity   • Alcohol use: Yes     Alcohol/week: 2.0 standard drinks of alcohol     Types: 2 Standard drinks or equivalent per week     Comment: social   • Drug use: Yes     Types: Marijuana   • Sexual activity: Not Currently     Partners: Male     Birth control/protection: None     Comment: lifetime partners: 7; current partner: 2019   Other Topics Concern   • Not on file   Social History Narrative    Lutheran: no preference    Accepts blood products            Exercise: none    Calcium: 1 c milk daily, 1 cheese daily     Social Determinants of Health     Financial Resource Strain: Low Risk  (6/4/2021)    Overall Financial Resource Strain (CARDIA)    • Difficulty of Paying Living Expenses: Not hard at all   Food Insecurity: No Food Insecurity (6/4/2021)    Hunger Vital Sign    • Worried About Running Out of Food in the Last Year: Never true    • Ran Out of Food in the Last Year: Never true   Transportation Needs: No Transportation Needs (6/4/2021)    PRAPARE - Transportation    • Lack of Transportation (Medical): No    • Lack of Transportation (Non-Medical):  No   Physical Activity: Inactive (6/4/2021)    Exercise Vital Sign    • Days of Exercise per Week: 0 days    • Minutes of Exercise per Session: 0 min   Stress: Stress Concern Present (6/4/2021)    [facility]    • Feeling of Stress : Very much   Social Connections: Socially Isolated (6/4/2021)    Social Connection and Isolation Panel [NHANES]    • Frequency of Communication with Friends and Family: Once a week    • Frequency of Social Gatherings with Friends and Family: Once a week    • Attends Caodaism Services: Never    • Active Member of Clubs or Organizations: No    • Attends Club or Organization Meetings: Never    • Marital Status: Never  Intimate Partner Violence: Not At Risk (6/4/2021)    Humiliation, Afraid, Rape, and Kick questionnaire    • Fear of Current or Ex-Partner: No    • Emotionally Abused: No    • Physically Abused: No    • Sexually Abused: No   Housing Stability: Not on file         Allergies   Allergen Reactions   • Pollen Extract          Current Outpatient Medications:   •  multivitamin (THERAGRAN) TABS, Take 1 tablet by mouth daily, Disp: , Rfl:   •  Sodium Fluoride 5000 PPM 1.1 % PSTE, BRUSH A PEA SIZED AMOUNT ON TEETH at bedtime ( SPIT EXCESS, DO NOT RINSE EAT OR DRINK for 1 hour ), Disp: , Rfl:   •  levonorgestrel-ethinyl estradiol (SEASONALE) 0.15-0.03 MG per tablet, Take 1 tablet by mouth daily (Patient not taking: Reported on 9/18/2023), Disp: 91 tablet, Rfl: 3  •  meclizine (ANTIVERT) 25 mg tablet, Take 25 mg by mouth Three times daily as needed (Patient not taking: Reported on 9/18/2023), Disp: , Rfl:   •  valACYclovir (VALTREX) 1,000 mg tablet, Take 2 tablets (2,000 mg total) by mouth 2 (two) times a day for 1 day, Disp: 30 tablet, Rfl: 0    Review of Systems:  Constitutional :no fever, feels well, no tiredness, no recent weight gain or loss  ENT: no ear ache, no loss of hearing, no nosebleeds or nasal discharge, no sore throat or hoarseness. Cardiovascular: no complaints of slow or fast heart beat, no chest pain, no palpitations, no leg claudication or lower extremity edema. Respiratory: no complaints of shortness of shortness of breath, no ENGLISH  Breasts:no complaints of breast pain, breast lump, or nipple discharge  Gastrointestinal: no complaints of abdominal pain, constipation, nausea, vomiting, or diarrhea or bloody stools  Genitourinary : increase in clear, mucous discharge;  no complaints of dysuria, incontinence, pelvic pain, no dysmenorrhea, vaginal itching/burning, odor or abnormal vaginal bleeding and as noted in HPI.   Musculoskeletal: no complaints of arthralgia, no myalgia, no joint swelling or stiffness, no limb pain or swelling. Integumentary: no complaints of skin rash or lesion, itching or dry skin  Neurological: syncopal episode 3/2023, seeing PCP and cardiology in f/u. no complaints of headache, no confusion, no numbness or tingling, no dizziness  Mental Health: no anxiety, depression, SI    Objective      /68 (BP Location: Left arm, Patient Position: Sitting, Cuff Size: Large)   Ht 5' 7" (1.702 m)   Wt 66.9 kg (147 lb 6.4 oz)   LMP  (LMP Unknown)   Breastfeeding No   BMI 23.09 kg/m²     General:   appears stated age, cooperative, alert normal mood and affect. BMI 23.09   Neck: normal, supple,trachea midline, no masses   Heart: regular rate and rhythm, S1, S2 normal, no murmur, click, rub or gallop   Lungs: clear to auscultation bilaterally   Breasts: normal appearance, no masses or tenderness, No nipple retraction or dimpling, No nipple discharge or bleeding, No axillary or supraclavicular adenopathy, Normal to palpation without dominant masses   Abdomen: soft, non-tender, without masses or organomegaly   Vulva: normal female genitalia, no lesions   Vagina: normal vagina, no discharge, exudate, lesion, or erythema and Mucous clear/white discharge noted in the vaginal vault, does not appear abnormal, no erythema, abnormal bleeding or odor appreciated. Urethra: normal   Cervix: Normal, no discharge. Nontender. Uterus: normal   Adnexa: no mass, fullness, tenderness   Lymphatic palpation of lymph nodes in neck, axilla, groin and/or other locations: no lymphadenopathy or masses noted   Skin normal skin turgor and no rashes.    Psychiatric orientation to person, place, and time: normal. mood and affect: normal

## 2023-10-27 ENCOUNTER — LAB (OUTPATIENT)
Dept: LAB | Facility: CLINIC | Age: 28
End: 2023-10-27

## 2023-10-27 DIAGNOSIS — E78.5 HYPERLIPIDEMIA, UNSPECIFIED HYPERLIPIDEMIA TYPE: ICD-10-CM

## 2023-10-27 DIAGNOSIS — Z13.1 SCREENING FOR DIABETES MELLITUS: ICD-10-CM

## 2023-10-27 DIAGNOSIS — R55 SYNCOPE, UNSPECIFIED SYNCOPE TYPE: ICD-10-CM

## 2023-10-27 LAB
ALBUMIN SERPL BCP-MCNC: 4.4 G/DL (ref 3.5–5)
ALP SERPL-CCNC: 17 U/L (ref 34–104)
ALT SERPL W P-5'-P-CCNC: 16 U/L (ref 7–52)
ANION GAP SERPL CALCULATED.3IONS-SCNC: 9 MMOL/L
AST SERPL W P-5'-P-CCNC: 25 U/L (ref 13–39)
BILIRUB SERPL-MCNC: 0.56 MG/DL (ref 0.2–1)
BUN SERPL-MCNC: 10 MG/DL (ref 5–25)
CALCIUM SERPL-MCNC: 9.8 MG/DL (ref 8.4–10.2)
CHLORIDE SERPL-SCNC: 106 MMOL/L (ref 96–108)
CHOLEST SERPL-MCNC: 193 MG/DL
CO2 SERPL-SCNC: 25 MMOL/L (ref 21–32)
CREAT SERPL-MCNC: 0.75 MG/DL (ref 0.6–1.3)
GFR SERPL CREATININE-BSD FRML MDRD: 110 ML/MIN/1.73SQ M
GLUCOSE P FAST SERPL-MCNC: 99 MG/DL (ref 65–99)
HDLC SERPL-MCNC: 82 MG/DL
LDLC SERPL CALC-MCNC: 104 MG/DL (ref 0–100)
NONHDLC SERPL-MCNC: 111 MG/DL
POTASSIUM SERPL-SCNC: 4.1 MMOL/L (ref 3.5–5.3)
PROT SERPL-MCNC: 7 G/DL (ref 6.4–8.4)
SODIUM SERPL-SCNC: 140 MMOL/L (ref 135–147)
TRIGL SERPL-MCNC: 34 MG/DL

## 2023-10-27 PROCEDURE — 80053 COMPREHEN METABOLIC PANEL: CPT

## 2023-10-27 PROCEDURE — 80061 LIPID PANEL: CPT

## 2023-10-27 PROCEDURE — 36415 COLL VENOUS BLD VENIPUNCTURE: CPT

## 2023-11-13 ENCOUNTER — OFFICE VISIT (OUTPATIENT)
Dept: FAMILY MEDICINE CLINIC | Facility: CLINIC | Age: 28
End: 2023-11-13
Payer: COMMERCIAL

## 2023-11-13 VITALS
OXYGEN SATURATION: 99 % | DIASTOLIC BLOOD PRESSURE: 68 MMHG | HEIGHT: 67 IN | HEART RATE: 77 BPM | WEIGHT: 151.4 LBS | SYSTOLIC BLOOD PRESSURE: 90 MMHG | TEMPERATURE: 97.8 F | BODY MASS INDEX: 23.76 KG/M2

## 2023-11-13 DIAGNOSIS — K55.1 SMAS (SUPERIOR MESENTERIC ARTERY SYNDROME) (HCC): ICD-10-CM

## 2023-11-13 DIAGNOSIS — Z11.59 NEED FOR HEPATITIS C SCREENING TEST: ICD-10-CM

## 2023-11-13 DIAGNOSIS — Z00.00 ANNUAL PHYSICAL EXAM: Primary | ICD-10-CM

## 2023-11-13 DIAGNOSIS — Z13.0 SCREENING, ANEMIA, DEFICIENCY, IRON: ICD-10-CM

## 2023-11-13 DIAGNOSIS — Z13.1 SCREENING FOR DIABETES MELLITUS: ICD-10-CM

## 2023-11-13 DIAGNOSIS — Z13.29 SCREENING FOR THYROID DISORDER: ICD-10-CM

## 2023-11-13 DIAGNOSIS — E78.5 HYPERLIPIDEMIA, UNSPECIFIED HYPERLIPIDEMIA TYPE: ICD-10-CM

## 2023-11-13 PROCEDURE — 99395 PREV VISIT EST AGE 18-39: CPT | Performed by: NURSE PRACTITIONER

## 2023-11-13 NOTE — PROGRESS NOTES
ADULT ANNUAL [facility]    NAME: Willi Gunter  AGE: 34 y.o. SEX: female  : 1995     DATE: 2023     Assessment and Plan:   Comprehensive physical exam  PMH and chronic conditions reviewed  Medications reconciled  Preventative care and recommended screenings as below   Fasting routine labs ordered to complete prior to next physical - one year  Continue annual physicals  Follow-up sooner as needed  Problem List Items Addressed This Visit          Digestive    SMAS (superior mesenteric artery syndrome) ([address])       Other    Hyperlipidemia    Relevant Orders    Lipid panel     Other Visit Diagnoses       Annual physical exam    -  Primary    Screening for diabetes mellitus        Relevant Orders    Comprehensive metabolic panel    Screening for thyroid disorder        Relevant Orders    TSH, 3rd generation with Free T4 reflex    Screening, anemia, deficiency, iron        Relevant Orders    CBC and differential              Immunizations and preventive care screenings were discussed with patient today. Appropriate education was printed on patient's after visit summary. Counseling:  Alcohol/drug use: discussed moderation in alcohol intake, the recommendations for healthy alcohol use, and avoidance of illicit drug use. Dental Health: discussed importance of regular tooth brushing, flossing, and dental visits. Injury prevention: discussed safety/seat belts, safety helmets, smoke detectors, carbon dioxide detectors, and smoking near bedding or upholstery. Sexual health: discussed sexually transmitted diseases, partner selection, use of condoms, avoidance of unintended pregnancy, and contraceptive alternatives. Exercise: the importance of regular exercise/physical activity was discussed. Recommend exercise 3-5 times per week for at least 30 minutes. BMI Counseling: Body mass index is 23.71 kg/m².  The BMI is above normal. Nutrition recommendations include moderation in carbohydrate intake and reducing intake of saturated and trans fat. Exercise recommendations include moderate physical activity 150 minutes/week. Rationale for BMI follow-up plan is due to patient being overweight or obese. Healthy lifestyle counseling    Depression Screening and Follow-up Plan: Clincally patient does not have depression. No treatment is required. Tobacco Cessation Counseling: Tobacco cessation counseling was provided. The patient is sincerely urged to quit consumption of tobacco. She is not ready to quit tobacco. Marijuana only        Return in about 1 year (around 11/13/2024) for Annual physical.     Chief Complaint:     Chief Complaint   Patient presents with    Follow-up      History of Present Illness:     Adult Annual Physical   Patient here for a comprehensive physical exam. The patient reports no problems. Diet and Physical Activity  Diet/Nutrition: well balanced diet and has been eating more fast foods lately, but has over all been working on healthier eating habits . Exercise: walking and moderate cardiovascular exercise. Depression Screening  PHQ-2/9 Depression Screening           General Health  Sleep: sleeps well and uses marijuana for sleep assist - has medical card . Hearing: normal - bilateral.  Vision: no vision problems, goes for regular eye exams, and wears glasses. Dental: regular dental visits. Immunizations: age appropriate vaccines UTD    /GYN Health  Last menstrual period: 10/1  Contraceptive method:  no .  History of STDs?: no.  Women's health UTD  PAP 2022  Advise monthly breast exams  Discussed Vitamin  supplements: recommend 2000 IU Vitamin D daily    Advanced Care Planning  Do you have an advanced directive? no  Do you have a durable medical power of ? no     Review of Systems:     Review of Systems   Constitutional: Negative. HENT: Negative. Respiratory: Negative. Cardiovascular: Negative. Gastrointestinal: Negative. Genitourinary: Negative. Musculoskeletal: Negative. Skin: Negative. Neurological: Negative. Psychiatric/Behavioral: Negative. 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)     Herpes     PERIORAL    Mononucleosis     Need for HPV vaccine     2/3 vaccines    Varicella vaccine     Vertigo       Past Surgical History:     Past Surgical History:   Procedure Laterality Date    TOOTH EXTRACTION      UPPER GASTROINTESTINAL ENDOSCOPY      WISDOM TOOTH EXTRACTION        Social History:     Social History     Socioeconomic History    Marital status: Single     Spouse name: None    Number of children: 0    Years of education: some college    Highest education level: High school graduate   Occupational History    Occupation: works at [address] [place] [address] [employer] Use    Smoking status: Former     Packs/day: 0.25     Types: Cigarettes     Quit date: 2018     Years since quittin.8    Smokeless tobacco: Current    Tobacco comments:     Patient vapes   Vaping Use    Vaping Use: Every day    Substances: Nicotine, THC, CBD, Flavoring   Substance and Sexual Activity    Alcohol use:  Yes     Alcohol/week: 2.0 standard drinks of alcohol     Types: 2 Standard drinks or equivalent per week     Comment: social    Drug use: Yes     Types: Marijuana    Sexual activity: Not Currently     Partners: Male     Birth control/protection: None     Comment: lifetime partners: 7; current partner: 2019   Other Topics Concern    None   Social History Narrative    Bahai: no preference    Accepts blood products            Exercise: none    Calcium: 1 c milk daily, 1 cheese daily     Social Determinants of Health     Financial Resource Strain: Low Risk  (2021)    Overall Financial Resource Strain (CARDIA)     Difficulty of Paying Living Expenses: Not hard at all   Food Insecurity: No Food Insecurity (6/4/2021)    Hunger Vital Sign     Worried About Running Out of Food in the Last Year: Never true     Ran Out of Food in the Last Year: Never true   Transportation Needs: No Transportation Needs (6/4/2021)    PRAPARE - Transportation     Lack of Transportation (Medical): No     Lack of Transportation (Non-Medical):  No   Physical Activity: Inactive (6/4/2021)    Exercise Vital Sign     Days of Exercise per Week: 0 days     Minutes of Exercise per Session: 0 min   Stress: Stress Concern Present (6/4/2021)    [facility]     Feeling of Stress : Very much   Social Connections: Socially Isolated (6/4/2021)    Social Connection and Isolation Panel [NHANES]     Frequency of Communication with Friends and Family: Once a week     Frequency of Social Gatherings with Friends and Family: Once a week     Attends Advent Services: Never     Active Member of Clubs or Organizations: No     Attends Club or Organization Meetings: Never     Marital Status: Never    Intimate Partner Violence: Not At Risk (6/4/2021)    Humiliation, Afraid, Rape, and Kick questionnaire     Fear of Current or Ex-Partner: No     Emotionally Abused: No     Physically Abused: No     Sexually Abused: No   Housing Stability: Not on file      Family History:     Family History   Problem Relation Age of Onset    Hypothyroidism Mother     No Known Problems Father     No Known Problems Brother     Kidney cancer Maternal Grandmother     Lymphoma Maternal Grandfather     Breast cancer Paternal Grandmother     Lung cancer Paternal Grandfather     Alpha-1 antitrypsin deficiency Paternal Aunt     Colon cancer Neg Hx     Ovarian cancer Neg Hx     Uterine cancer Neg Hx       Current Medications:     Current Outpatient Medications   Medication Sig Dispense Refill    multivitamin (THERAGRAN) TABS Take 1 tablet by mouth daily      Sodium Fluoride 5000 PPM 1.1 % PSTE BRUSH A PEA SIZED AMOUNT ON TEETH at bedtime ( SPIT EXCESS, DO NOT RINSE EAT OR DRINK for 1 hour )      valACYclovir (VALTREX) 1,000 mg tablet Take 2 tablets (2,000 mg total) by mouth 2 (two) times a day for 1 day 30 tablet 0    meclizine (ANTIVERT) 25 mg tablet Take 25 mg by mouth Three times daily as needed (Patient not taking: Reported on 9/18/2023)       No current facility-administered medications for this visit. Allergies: Allergies   Allergen Reactions    Pollen Extract       Physical Exam:     BP 90/68 (BP Location: Left arm, Patient Position: Sitting, Cuff Size: Adult)   Pulse 77   Temp 97.8 °F (36.6 °C)   Ht 5' 7" (1.702 m)   Wt 68.7 kg (151 lb 6.4 oz)   LMP 10/01/2023 (Approximate)   SpO2 99%   BMI 23.71 kg/m²     Physical Exam  Vitals and nursing note reviewed. Constitutional:       General: She is not in acute distress. Appearance: Normal appearance. She is well-developed and well-groomed. She is not ill-appearing. HENT:      Head: Normocephalic. Right Ear: Tympanic membrane, ear canal and external ear normal.      Left Ear: Tympanic membrane, ear canal and external ear normal.      Nose: Nose normal.      Mouth/Throat:      Mouth: Mucous membranes are moist.      Pharynx: Oropharynx is clear. Eyes:      Conjunctiva/sclera: Conjunctivae normal.      Pupils: Pupils are equal, round, and reactive to light. Neck:      Thyroid: No thyromegaly. Vascular: No carotid bruit. Cardiovascular:      Rate and Rhythm: Normal rate and regular rhythm. Heart sounds: Normal heart sounds. Pulmonary:      Effort: Pulmonary effort is normal. No respiratory distress. Breath sounds: Normal breath sounds and air entry. Abdominal:      General: Abdomen is flat. Bowel sounds are normal.      Palpations: Abdomen is soft. Tenderness: There is no abdominal tenderness. There is no guarding or rebound. Lymphadenopathy:      Cervical: No cervical adenopathy.    Skin: General: Skin is warm and dry. Neurological:      General: No focal deficit present. Mental Status: She is alert and oriented to person, place, and time. Psychiatric:         Attention and Perception: Attention normal.         Mood and Affect: Mood normal.         Behavior: Behavior normal.         Thought Content:  Thought content normal.         Judgment: Judgment normal.          DELONTE Ferreira   [address]

## 2023-12-29 DIAGNOSIS — Z30.41 SURVEILLANCE OF CONTRACEPTIVE PILL: Primary | ICD-10-CM

## 2023-12-29 RX ORDER — LEVONORGESTREL AND ETHINYL ESTRADIOL 0.15-0.03
1 KIT ORAL DAILY
Qty: 91 TABLET | Refills: 3 | Status: SHIPPED | OUTPATIENT
Start: 2023-12-29

## 2024-02-29 NOTE — TELEPHONE ENCOUNTER
Patient's insurance is accepted, currently scheduled Thursday 2/25 at 9:30 am with Dr Jessica Ramesh of vascular surgery at [facility] location  I was also able to get GES moved up to 3/2  Deneen Antoine with all of the updated appointments   She verbalized understanding PAIN

## 2024-09-23 ENCOUNTER — ANNUAL EXAM (OUTPATIENT)
Dept: OBGYN CLINIC | Facility: CLINIC | Age: 29
End: 2024-09-23
Payer: COMMERCIAL

## 2024-09-23 VITALS
BODY MASS INDEX: 23.45 KG/M2 | HEIGHT: 67 IN | SYSTOLIC BLOOD PRESSURE: 100 MMHG | DIASTOLIC BLOOD PRESSURE: 70 MMHG | WEIGHT: 149.4 LBS

## 2024-09-23 DIAGNOSIS — Z01.419 ENCOUNTER FOR GYNECOLOGICAL EXAMINATION (GENERAL) (ROUTINE) WITHOUT ABNORMAL FINDINGS: Primary | ICD-10-CM

## 2024-09-23 DIAGNOSIS — B00.1 RECURRENT COLD SORES: ICD-10-CM

## 2024-09-23 DIAGNOSIS — Z11.3 SCREEN FOR STD (SEXUALLY TRANSMITTED DISEASE): ICD-10-CM

## 2024-09-23 DIAGNOSIS — Z12.4 SCREENING FOR MALIGNANT NEOPLASM OF CERVIX: ICD-10-CM

## 2024-09-23 PROCEDURE — G0145 SCR C/V CYTO,THINLAYER,RESCR: HCPCS | Performed by: PHYSICIAN ASSISTANT

## 2024-09-23 PROCEDURE — 87591 N.GONORRHOEAE DNA AMP PROB: CPT | Performed by: PHYSICIAN ASSISTANT

## 2024-09-23 PROCEDURE — 99395 PREV VISIT EST AGE 18-39: CPT | Performed by: PHYSICIAN ASSISTANT

## 2024-09-23 PROCEDURE — 87491 CHLMYD TRACH DNA AMP PROBE: CPT | Performed by: PHYSICIAN ASSISTANT

## 2024-09-23 RX ORDER — VALACYCLOVIR HYDROCHLORIDE 1 G/1
2000 TABLET, FILM COATED ORAL 2 TIMES DAILY
Qty: 30 TABLET | Refills: 0 | Status: SHIPPED | OUTPATIENT
Start: 2024-09-23 | End: 2024-09-24

## 2024-09-23 NOTE — PROGRESS NOTES
ASSESSMENT & PLAN: Eun Gilmore is a 29 y.o.  with normal gynecologic exam.    1.  Routine well woman exam done today  2.  Pap and HPV:  The patient's last pap was .    It was normal.    Pap was done today.    Current ASCCP Guidelines reviewed.   3.  STD testing  was done   4.  Gardasil vaccine series completed.    5. The following were reviewed in today's visit: breast self exam, STD testing, use and side effects of OCPs, adequate intake of calcium and vitamin D, exercise, healthy diet, and age-appropriate recommendations Reading screenings and prevention.  6.  Patient discontinued OCPs as she was no longer sexually active.  She does plan on potentially restarting them if she becomes sexually active again.  She will let me know if she desires to restart.  7.  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  Valtrex refilled for her to have on hand in event of cold sore flare.    RTO 1 year annual exam, sooner problems arise in the interim.    CC:  Annual Gynecologic Examination    HPI: Eun Gilmore is a 29 y.o.  who presents for annual gynecologic examination.      She has the following concerns:  none.  Stopped OCP not sexually active. Had no issues with it.   Does not desire to continue at this time.     Healthy diet No;   Exercise No; walking  Vitamins Yes; MVI when shje remebers.     Patient's last menstrual period was 08/15/2024.    Menses frequency: regular once a month  Length of bleedin days  Bleeding quality: heaviest day 1 then tapers toaverage  Sxs with menses: normal cramping.       Health Maintenance:      She does perform irregular monthly self breast exams.  Denies breast pain, lump, skin change or nipple discharge.      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     History of sexually transmitted infection: hx CT treated.  History of abnormal pap smears: No .    Patient is not currently sexually active.    The current method of family planning is OCP restart if plans on becoming sexually active again.    OB History          0    Para   0    Term   0       0    AB   0    Living   0         SAB   0    IAB   0    Ectopic   0    Multiple   0    Live Births   0           Obstetric Comments   Menarche 15    Menses: 28/5/super tampon every 8 hours                 Family History   Problem Relation Age of Onset    Hypothyroidism Mother     No Known Problems Father     No Known Problems Brother     Kidney cancer Maternal Grandmother     Lymphoma Maternal Grandfather     Breast cancer Paternal Grandmother     Lung cancer Paternal Grandfather     Alpha-1 antitrypsin deficiency Paternal Aunt     Colon cancer Neg Hx     Ovarian cancer Neg Hx     Uterine cancer Neg Hx        Family history of Breast/Uterine/Ovarian/Colon Cancer: denies    Social History:  Social History     Socioeconomic History    Marital status: Single     Spouse name: Not on file    Number of children: 0    Years of education: some college    Highest education level: High school graduate   Occupational History    Occupation: works at [employer]   Tobacco Use    Smoking status: Former     Current packs/day: 0.00     Types: Cigarettes     Quit date: 2018     Years since quittin.7    Smokeless tobacco: Current    Tobacco comments:     Patient vapes   Vaping Use    Vaping status: Every Day    Substances: Nicotine, THC, CBD, Flavoring   Substance and Sexual Activity    Alcohol use: Yes     Alcohol/week: 2.0 standard drinks of alcohol     Types: 2 Standard drinks or  equivalent per week     Comment: social    Drug use: Yes     Types: Marijuana    Sexual activity: Not Currently     Partners: Male     Birth control/protection: None     Comment: lifetime partners: 7; current partner: 2019   Other Topics Concern    Not on file   Social History Narrative    Mandaen: no preference    Accepts blood products            Exercise: none    Calcium: 1 c milk daily, 1 cheese daily     Social Determinants of Health     Financial Resource Strain: Low Risk  (6/4/2021)    Overall Financial Resource Strain (CARDIA)     Difficulty of Paying Living Expenses: Not hard at all   Food Insecurity: No Food Insecurity (6/4/2021)    Hunger Vital Sign     Worried About Running Out of Food in the Last Year: Never true     Ran Out of Food in the Last Year: Never true   Transportation Needs: No Transportation Needs (6/4/2021)    PRAPARE - Transportation     Lack of Transportation (Medical): No     Lack of Transportation (Non-Medical): No   Physical Activity: Inactive (6/4/2021)    Exercise Vital Sign     Days of Exercise per Week: 0 days     Minutes of Exercise per Session: 0 min   Stress: Stress Concern Present (6/4/2021)    [employer] [place] [employer] - Occupational Stress Questionnaire     Feeling of Stress : Very much   Social Connections: Socially Isolated (6/4/2021)    Social Connection and Isolation Panel [NHANES]     Frequency of Communication with Friends and Family: Once a week     Frequency of Social Gatherings with Friends and Family: Once a week     Attends Sabianist Services: Never     Active Member of Clubs or Organizations: No     Attends Club or Organization Meetings: Never     Marital Status: Never    Intimate Partner Violence: Not At Risk (6/4/2021)    Humiliation, Afraid, Rape, and Kick questionnaire     Fear of Current or Ex-Partner: No     Emotionally Abused: No     Physically Abused: No     Sexually Abused: No   Housing Stability: Not on file         Allergies  "  Allergen Reactions    Pollen Extract          Current Outpatient Medications:     multivitamin (THERAGRAN) TABS, Take 1 tablet by mouth daily, Disp: , Rfl:     Sodium Fluoride 5000 PPM 1.1 % PSTE, BRUSH A PEA SIZED AMOUNT ON TEETH at bedtime ( SPIT EXCESS, DO NOT RINSE EAT OR DRINK for 1 hour ), Disp: , Rfl:     valACYclovir (VALTREX) 1,000 mg tablet, Take 2 tablets (2,000 mg total) by mouth 2 (two) times a day for 1 day, Disp: 30 tablet, Rfl: 0    meclizine (ANTIVERT) 25 mg tablet, Take 25 mg by mouth Three times daily as needed (Patient not taking: Reported on 9/18/2023), Disp: , Rfl:     Review of Systems:  Constitutional :no fever, feels well, no tiredness, no recent weight gain or loss  ENT: no ear ache, no loss of hearing, no nosebleeds or nasal discharge, no sore throat or hoarseness.  Cardiovascular: no complaints of slow or fast heart beat, no chest pain, no palpitations, no leg claudication or lower extremity edema.  Respiratory: no complaints of shortness of shortness of breath, no ENGLISH  Breasts:no complaints of breast pain, breast lump, or nipple discharge  Gastrointestinal: no complaints of abdominal pain, constipation, nausea, vomiting, or diarrhea or bloody stools  Genitourinary : no complaints of dysuria, incontinence, pelvic pain, no dysmenorrhea, vaginal discharge/itch/odor or abnormal vaginal bleeding.  Musculoskeletal: no complaints of arthralgia, no myalgia, no joint swelling or stiffness, no limb pain or swelling.  Integumentary: no recent issues with cold sores. no complaints of skin rash or lesion, itching or dry skin  Neurological: no complaints of headache, no confusion, no numbness or tingling, no dizziness or fainting  Mental Health: no anxiety, depression, SI    Objective      /70 (BP Location: Left arm, Patient Position: Sitting, Cuff Size: Adult)   Ht 5' 7\" (1.702 m)   Wt 67.8 kg (149 lb 6.4 oz)   LMP 08/15/2024   BMI 23.40 kg/m²     General:   appears stated age, " cooperative, alert normal mood and affect.  BMI 23.4   Neck: normal, supple,trachea midline, no masses.  Thyroid palpated normal.   Heart: regular rate and rhythm, S1, S2 normal, no murmur, click, rub or gallop   Lungs: clear to auscultation bilaterally   Breasts: normal appearance, no masses or tenderness, No nipple retraction or dimpling, No nipple discharge or bleeding, No axillary or supraclavicular adenopathy, Normal to palpation without dominant masses   Abdomen: soft, non-tender, without masses or organomegaly   Vulva: normal female genitalia, no lesions   Vagina: normal vagina, no discharge, exudate, lesion, or erythema   Urethra: normal   Cervix: Normal, no discharge. PAP done. Nontender. Uncomplicated nabothian cyst present.    Uterus: normal size, contour, position, consistency, mobility, non-tender   Adnexa: no mass, fullness, tenderness   Lymphatic palpation of lymph nodes in neck, axilla, groin and/or other locations: no lymphadenopathy or masses noted   Skin normal skin turgor and no rashes.   Psychiatric orientation to person, place, and time: normal. mood and affect: normal

## 2024-09-24 LAB
C TRACH DNA SPEC QL NAA+PROBE: NEGATIVE
N GONORRHOEA DNA SPEC QL NAA+PROBE: NEGATIVE

## 2024-09-30 LAB
LAB AP GYN PRIMARY INTERPRETATION: NORMAL
Lab: NORMAL

## 2024-11-18 ENCOUNTER — OFFICE VISIT (OUTPATIENT)
Dept: FAMILY MEDICINE CLINIC | Facility: CLINIC | Age: 29
End: 2024-11-18
Payer: COMMERCIAL

## 2024-11-18 ENCOUNTER — RESULTS FOLLOW-UP (OUTPATIENT)
Dept: FAMILY MEDICINE CLINIC | Facility: CLINIC | Age: 29
End: 2024-11-18

## 2024-11-18 ENCOUNTER — APPOINTMENT (OUTPATIENT)
Dept: RADIOLOGY | Facility: CLINIC | Age: 29
End: 2024-11-18
Payer: COMMERCIAL

## 2024-11-18 VITALS
DIASTOLIC BLOOD PRESSURE: 60 MMHG | BODY MASS INDEX: 23.54 KG/M2 | HEART RATE: 85 BPM | OXYGEN SATURATION: 99 % | SYSTOLIC BLOOD PRESSURE: 110 MMHG | TEMPERATURE: 98.1 F | WEIGHT: 150 LBS | HEIGHT: 67 IN

## 2024-11-18 DIAGNOSIS — Z00.00 ANNUAL PHYSICAL EXAM: Primary | ICD-10-CM

## 2024-11-18 DIAGNOSIS — Z11.4 SCREENING FOR HIV (HUMAN IMMUNODEFICIENCY VIRUS): ICD-10-CM

## 2024-11-18 DIAGNOSIS — S69.90XA FINGER INJURY, INITIAL ENCOUNTER: ICD-10-CM

## 2024-11-18 DIAGNOSIS — Z13.220 SCREENING, LIPID: ICD-10-CM

## 2024-11-18 DIAGNOSIS — Z13.29 SCREENING FOR THYROID DISORDER: ICD-10-CM

## 2024-11-18 DIAGNOSIS — Z13.0 SCREENING, ANEMIA, DEFICIENCY, IRON: ICD-10-CM

## 2024-11-18 DIAGNOSIS — Z13.1 SCREENING FOR DIABETES MELLITUS: ICD-10-CM

## 2024-11-18 DIAGNOSIS — Z11.59 NEED FOR HEPATITIS C SCREENING TEST: ICD-10-CM

## 2024-11-18 PROCEDURE — 73140 X-RAY EXAM OF FINGER(S): CPT

## 2024-11-18 PROCEDURE — 99395 PREV VISIT EST AGE 18-39: CPT | Performed by: NURSE PRACTITIONER

## 2024-11-18 NOTE — PROGRESS NOTES
Adult Annual Physical  Name: Eun Gilmore      : 1995      MRN: [id number]  Encounter Provider: DELONTE Hernandez  Encounter Date: 2024   Encounter department: [facility]    Assessment & Plan  Finger injury, initial encounter  Trauma to her finger 10/31  Object fell onto her finger  She did splinted for 1 to 2 weeks  Still with mild pain with bending at times  Small palpable lump, first finger-  proximal phalanx  Will check x-ray today  Orders:    XR finger left second digit-index; Future    Need for hepatitis C screening test    Orders:    Hepatitis C Antibody; Future    Screening for HIV (human immunodeficiency virus)    Orders:    HIV 1/2 AG/AB w Reflex [facility] for 2 yr old and above; Future    Screening for diabetes mellitus    Orders:    Comprehensive metabolic panel; Future    Screening for thyroid disorder    Orders:    TSH, 3rd generation with Free T4 reflex; Future    Screening, anemia, deficiency, iron    Orders:    CBC and differential; Future    Screening, lipid    Orders:    Lipid panel; Future    Annual physical exam         Immunizations and preventive care screenings were discussed with patient today. Appropriate education was printed on patient's after visit summary.    Counseling:  Alcohol/drug use: discussed moderation in alcohol intake, the recommendations for healthy alcohol use, and avoidance of illicit drug use.  Dental Health: discussed importance of regular tooth brushing, flossing, and dental visits.  Injury prevention: discussed safety/seat belts, safety helmets, smoke detectors, carbon monoxide detectors, and smoking near bedding or upholstery.  Sexual health: discussed sexually transmitted diseases, partner selection, use of condoms, avoidance of unintended pregnancy, and contraceptive alternatives.  Exercise: the importance of regular exercise/physical activity was discussed. Recommend exercise 3-5 times per week for at least 30 minutes.  "      Depression Screening and Follow-up Plan: Patient was screened for depression during today's encounter. They screened negative with a PHQ-9 score of 0.        History of Present Illness     Adult Annual Physical:  Patient presents for annual physical. Pleasant 29-year-old female presents today for annual wellness  Age-appropriate preventative care/recommended screenings reviewed  Immunizations reviewed: Tdap/HPV up-to-date; patient declines influenza/COVID  Routine/fasting lab work orders placed today  We will x-ray finger-traumatic injury on 10/31  Depression screen negative-reports she feels well with no depression/anxiety.     Diet and Physical Activity:  - Diet/Nutrition: well balanced diet. Encourage Mediterranean style diet  - Exercise: walking and moderate cardiovascular exercise. Encouraged daily exercise-20/30 minutes/day: Cardio/strengthening    Depression Screening:    - PHQ-9 Score: 0    General Health:  - Sleep: sleeps well.  - Hearing: normal hearing bilateral ears.  - Vision: no vision problems and goes for regular eye exams.  - Dental: no dental visits for > 1 year. Patient to schedule routine dental check in the next few weeks    /GYN Health:  - Follows with GYN: yes.   - Menopause: premenopausal.   - History of STDs: no  - Contraception:. Up-to-date with women's health      Advanced Care Planning:  - Has an advanced directive?: no    - Has a durable medical POA?: no    - ACP document given to patient?: no      Review of Systems   Constitutional: Negative.    HENT: Negative.     Eyes: Negative.    Respiratory: Negative.     Cardiovascular: Negative.    Gastrointestinal: Negative.    Genitourinary: Negative.    Musculoskeletal: Negative.         Recent finger injury as above   Skin: Negative.    Neurological: Negative.    Psychiatric/Behavioral: Negative.           Objective   /60 (BP Location: Left arm, Patient Position: Sitting)   Pulse 85   Temp 98.1 °F (36.7 °C)   Ht 5' 6.93\" (1.7 " m)   Wt 68 kg (150 lb)   LMP 10/29/2024 (Exact Date)   SpO2 99%   Breastfeeding No   BMI 23.54 kg/m²     Physical Exam  Vitals and nursing note reviewed.   Constitutional:       General: She is not in acute distress.     Appearance: Normal appearance. She is well-developed and well-groomed. She is not ill-appearing.   HENT:      Head: Normocephalic.      Right Ear: Ear canal and external ear normal.      Left Ear: Tympanic membrane, ear canal and external ear normal.      Ears:      Comments: Right ear canal with increased cerumen  Advise OTC Debrox  Follow-up in office for lavage if ineffective     Nose: Nose normal.      Mouth/Throat:      Mouth: Mucous membranes are moist.      Pharynx: Oropharynx is clear.   Eyes:      Conjunctiva/sclera: Conjunctivae normal.      Pupils: Pupils are equal, round, and reactive to light.   Neck:      Thyroid: No thyromegaly.      Vascular: No carotid bruit.   Cardiovascular:      Rate and Rhythm: Normal rate and regular rhythm.      Pulses:           Posterior tibial pulses are 2+ on the right side and 2+ on the left side.      Heart sounds: Normal heart sounds.   Pulmonary:      Effort: Pulmonary effort is normal. No respiratory distress.      Breath sounds: Normal breath sounds and air entry.   Abdominal:      General: Bowel sounds are normal.      Palpations: Abdomen is soft.      Tenderness: There is no abdominal tenderness.   Musculoskeletal:      Right lower leg: No edema.      Left lower leg: No edema.   Lymphadenopathy:      Cervical: No cervical adenopathy.   Skin:     General: Skin is warm and dry.   Neurological:      General: No focal deficit present.      Mental Status: She is alert and oriented to person, place, and time.   Psychiatric:         Attention and Perception: Attention normal.         Mood and Affect: Mood normal.         Behavior: Behavior normal.         Thought Content: Thought content normal.         Judgment: Judgment normal.

## 2025-01-17 DIAGNOSIS — B00.1 RECURRENT COLD SORES: ICD-10-CM

## 2025-01-23 RX ORDER — VALACYCLOVIR HYDROCHLORIDE 1 G/1
TABLET, FILM COATED ORAL
Qty: 30 TABLET | Refills: 0 | Status: SHIPPED | OUTPATIENT
Start: 2025-01-23 | End: 2025-07-23